# Patient Record
Sex: FEMALE | Race: WHITE | NOT HISPANIC OR LATINO | Employment: FULL TIME | URBAN - METROPOLITAN AREA
[De-identification: names, ages, dates, MRNs, and addresses within clinical notes are randomized per-mention and may not be internally consistent; named-entity substitution may affect disease eponyms.]

---

## 2018-03-09 ENCOUNTER — OFFICE VISIT (OUTPATIENT)
Dept: RETAIL CLINIC | Facility: CLINIC | Age: 28
End: 2018-03-09

## 2018-03-09 VITALS
HEART RATE: 86 BPM | OXYGEN SATURATION: 98 % | TEMPERATURE: 97.5 F | DIASTOLIC BLOOD PRESSURE: 92 MMHG | SYSTOLIC BLOOD PRESSURE: 130 MMHG

## 2018-03-09 DIAGNOSIS — J40 BRONCHITIS: Primary | ICD-10-CM

## 2018-03-09 PROCEDURE — 99213 OFFICE O/P EST LOW 20 MIN: CPT | Performed by: NURSE PRACTITIONER

## 2018-03-09 RX ORDER — ALBUTEROL SULFATE 90 UG/1
2 AEROSOL, METERED RESPIRATORY (INHALATION) EVERY 4 HOURS PRN
Qty: 1 INHALER | Refills: 0 | Status: SHIPPED | OUTPATIENT
Start: 2018-03-09

## 2018-03-09 RX ORDER — PREDNISONE 20 MG/1
TABLET ORAL
Qty: 20 TABLET | Refills: 0 | Status: SHIPPED | OUTPATIENT
Start: 2018-03-09 | End: 2021-12-08

## 2018-03-09 RX ORDER — ALBUTEROL SULFATE 90 UG/1
2 AEROSOL, METERED RESPIRATORY (INHALATION) EVERY 4 HOURS PRN
COMMUNITY
End: 2021-12-08 | Stop reason: SDUPTHER

## 2018-03-09 RX ORDER — AZITHROMYCIN 250 MG/1
TABLET, FILM COATED ORAL
Qty: 6 TABLET | Refills: 0 | Status: SHIPPED | OUTPATIENT
Start: 2018-03-09 | End: 2021-12-08

## 2018-03-09 NOTE — PROGRESS NOTES
Subjective:     Shirin Pena is a 28 y.o.     Cough   This is a new problem. The current episode started in the past 7 days. The problem has been gradually worsening. The cough is productive of sputum. Associated symptoms include nasal congestion (mild). Pertinent negatives include no chills, ear congestion, ear pain, fever, sore throat or wheezing. Shortness of breath: mild. Treatments tried: dayquil, nyquil, mucinex sinus, albuterol inhaler. The treatment provided mild relief.         The following portions of the patient's history were reviewed and updated as appropriate: allergies, current medications, past family history, past medical history, past social history, past surgical history and problem list.      Review of Systems   Constitutional: Positive for fatigue. Negative for appetite change, chills and fever.   HENT: Positive for congestion (mild). Negative for ear pain, sinus pain, sinus pressure and sore throat.    Respiratory: Positive for cough. Negative for wheezing. Shortness of breath: mild.    Cardiovascular: Negative.    Gastrointestinal: Negative for diarrhea, nausea and vomiting.   Musculoskeletal: Back pain: from coughing.   Skin: Negative for color change and pallor.   Neurological: Positive for dizziness and light-headedness.         Objective:      Physical Exam   Constitutional: She is oriented to person, place, and time. She appears well-developed and well-nourished. She is cooperative.   HENT:   Head: Normocephalic and atraumatic.   Right Ear: Tympanic membrane and ear canal normal.   Left Ear: Tympanic membrane and ear canal normal.   Mouth/Throat: Posterior oropharyngeal erythema present. No oropharyngeal exudate.   Eyes: Conjunctivae, EOM and lids are normal. Pupils are equal, round, and reactive to light.   Neck: Normal range of motion. Neck supple.   Cardiovascular: Normal rate, regular rhythm, S1 normal, S2 normal and normal heart sounds.    Pulmonary/Chest: Effort normal. She has  rhonchi in the right upper field, the right middle field and the left upper field.   Abdominal: Soft. Normal appearance and bowel sounds are normal. There is no tenderness.   Musculoskeletal: Normal range of motion.   Lymphadenopathy:     She has no cervical adenopathy.   Neurological: She is alert and oriented to person, place, and time.   Skin: Skin is warm, dry and intact.   Psychiatric: She has a normal mood and affect. Her speech is normal and behavior is normal. Thought content normal.   Vitals reviewed.          Shirin was seen today for cough.    Diagnoses and all orders for this visit:    Bronchitis    Other orders  -     predniSONE (DELTASONE) 20 MG tablet; Prednisone 20mg tabs, 3 for 3 days, 2 for 3 days, 1 for 3 days, 1/2 for 3 days take with food or milk  -     albuterol (PROVENTIL HFA) 108 (90 Base) MCG/ACT inhaler; Inhale 2 puffs Every 4 (Four) Hours As Needed for Wheezing or Shortness of Air.  -     azithromycin (ZITHROMAX) 250 MG tablet; Take 2 tablets the first day, then 1 tablet daily for 4 days.    If symptoms worsen or persist follow up with primary care provider.

## 2021-01-04 ENCOUNTER — IMMUNIZATION (OUTPATIENT)
Dept: VACCINE CLINIC | Facility: HOSPITAL | Age: 31
End: 2021-01-04

## 2021-01-04 PROCEDURE — 0001A: CPT | Performed by: INTERNAL MEDICINE

## 2021-01-04 PROCEDURE — 91300 HC SARSCOV02 VAC 30MCG/0.3ML IM: CPT | Performed by: INTERNAL MEDICINE

## 2021-01-25 ENCOUNTER — IMMUNIZATION (OUTPATIENT)
Dept: VACCINE CLINIC | Facility: HOSPITAL | Age: 31
End: 2021-01-25

## 2021-01-25 PROCEDURE — 0002A: CPT | Performed by: INTERNAL MEDICINE

## 2021-01-25 PROCEDURE — 91300 HC SARSCOV02 VAC 30MCG/0.3ML IM: CPT | Performed by: INTERNAL MEDICINE

## 2021-12-08 ENCOUNTER — OFFICE VISIT (OUTPATIENT)
Dept: INTERNAL MEDICINE | Facility: CLINIC | Age: 31
End: 2021-12-08

## 2021-12-08 VITALS
BODY MASS INDEX: 21.82 KG/M2 | SYSTOLIC BLOOD PRESSURE: 110 MMHG | WEIGHT: 139 LBS | DIASTOLIC BLOOD PRESSURE: 80 MMHG | HEIGHT: 67 IN | TEMPERATURE: 97.4 F

## 2021-12-08 DIAGNOSIS — L65.9 THINNING HAIR: Primary | ICD-10-CM

## 2021-12-08 DIAGNOSIS — Z11.4 SCREENING FOR HIV (HUMAN IMMUNODEFICIENCY VIRUS): ICD-10-CM

## 2021-12-08 DIAGNOSIS — K21.9 GASTROESOPHAGEAL REFLUX DISEASE, UNSPECIFIED WHETHER ESOPHAGITIS PRESENT: ICD-10-CM

## 2021-12-08 DIAGNOSIS — R63.4 WEIGHT LOSS: ICD-10-CM

## 2021-12-08 DIAGNOSIS — Z13.220 SCREENING FOR HYPERLIPIDEMIA: ICD-10-CM

## 2021-12-08 DIAGNOSIS — H61.21 IMPACTED CERUMEN OF RIGHT EAR: ICD-10-CM

## 2021-12-08 DIAGNOSIS — Z00.00 ANNUAL PHYSICAL EXAM: ICD-10-CM

## 2021-12-08 DIAGNOSIS — Z11.59 NEED FOR HEPATITIS C SCREENING TEST: ICD-10-CM

## 2021-12-08 PROCEDURE — 99204 OFFICE O/P NEW MOD 45 MIN: CPT | Performed by: STUDENT IN AN ORGANIZED HEALTH CARE EDUCATION/TRAINING PROGRAM

## 2021-12-08 PROCEDURE — 99385 PREV VISIT NEW AGE 18-39: CPT | Performed by: STUDENT IN AN ORGANIZED HEALTH CARE EDUCATION/TRAINING PROGRAM

## 2021-12-08 RX ORDER — OMEPRAZOLE 20 MG/1
20 CAPSULE, DELAYED RELEASE ORAL DAILY
COMMUNITY
End: 2021-12-08 | Stop reason: SDUPTHER

## 2021-12-08 RX ORDER — OMEPRAZOLE 20 MG/1
20 CAPSULE, DELAYED RELEASE ORAL DAILY
Qty: 90 CAPSULE | Refills: 3 | Status: SHIPPED | OUTPATIENT
Start: 2021-12-08

## 2021-12-08 NOTE — PROGRESS NOTES
"    Chief Complaint  Annual checkup    HISTORY    Shirin Mercer is a 31 y.o. female who presents to the office today as a  a new patient for their annual preventative exam. Their last preventative exam was years ago.     No hospitalization(s) within the last year.     Status of chronic medical conditions:    Previously saw Dr Ledezma, probably last saw them 5 or so years ago. Otherwise got care at urgent care places as needed.     Allergies: states she has an albuterol inhaler as needed, states she uses it a few times per year. States she has had the same inhaler now for a while.     GERD: States she has bene on prilosec for around a year and that helps a lot. States it is triggered by spicy food, stress, if she stays up too late. Denies food getting stuck in throat or pain in her stomach.     Sees [facility] Murray in February or March and they do her pap smears.     First day of last menstrual period if pre-menopausal: patient reports only occasional periods (5 or so per year) with her IUD     Patient's contraception status (if applicable): IUD    For exercise patient states she walks \"a lot\".     States she has made some changes in her diet , stopped eating at cafeteria and ate low carb meals with vegetables and fruits while staying away from sugar, now eating whatever she wanted and continued to have weight loss. Previously weighed 175 lbs- 180 lbs in the summer of 2020. Denies heart palpitations. States she has felt very very thirsty as well. Her  has noticed her hair is thinner than it used to be. The past couple of months she has stabilized and not lost any further weight.     Health Maintenance Summary          Overdue - TDAP/TD VACCINES (1 - Tdap) Overdue - never done    No completion history exists for this topic.          Ordered - HEPATITIS C SCREENING (Once) Ordered on 12/8/2021    No completion history exists for this topic.          Overdue - PAP SMEAR (Every 3 Years) Overdue " "- never done    No completion history exists for this topic.          ANNUAL PHYSICAL (Yearly) Next due on 12/9/2022 12/08/2021  Done          COVID-19 Vaccine (Series Information) Completed    01/25/2021  Imm Admin: COVID-19 (PFIZER)    01/04/2021  Imm Admin: COVID-19 (PFIZER)          INFLUENZA VACCINE  Completed    09/10/2021  Patient-Reported (Performed Externally) - approximate date per patient report          Pneumococcal Vaccine 0-64 (Series Information) Aged Out    No completion history exists for this topic.                 No Known Allergies     Outpatient Medications Marked as Taking for the 12/8/21 encounter (Office Visit) with Prem Fairbanks DO   Medication Sig Dispense Refill   • albuterol (PROVENTIL HFA) 108 (90 Base) MCG/ACT inhaler Inhale 2 puffs Every 4 (Four) Hours As Needed for Wheezing or Shortness of Air. 1 inhaler 0   • omeprazole (priLOSEC) 20 MG capsule Take 1 capsule by mouth Daily. 90 capsule 3   • [DISCONTINUED] omeprazole (priLOSEC) 20 MG capsule Take 20 mg by mouth Daily.          Past Medical History:   Diagnosis Date   • Allergies    • GERD (gastroesophageal reflux disease)      Past Surgical History:   Procedure Laterality Date   • WISDOM TOOTH EXTRACTION       Family History   Problem Relation Age of Onset   • Hypercalcemia Father    • Obesity Father    • Hyperlipidemia Father    • Cancer Maternal Grandmother    • Cancer Maternal Grandfather    • Allergies Mother    • No Known Problems Sister    • No Known Problems Brother     reports that she has never smoked. She has never used smokeless tobacco. She reports current alcohol use of about 2.0 standard drinks of alcohol per week. She reports that she does not use drugs.    Immunization History   Administered Date(s) Administered   • COVID-19 (PFIZER) 01/04/2021, 01/25/2021        OBJECTIVE    Vital Signs:   /80   Temp 97.4 °F (36.3 °C)   Ht 170.2 cm (67\")   Wt 63 kg (139 lb)   BMI 21.77 kg/m²     Physical " Exam  Constitutional:       General: She is not in acute distress.     Appearance: Normal appearance.   HENT:      Head: Normocephalic and atraumatic.      Right Ear: External ear normal. There is impacted cerumen.      Left Ear: Tympanic membrane, ear canal and external ear normal. There is no impacted cerumen.      Mouth/Throat:      Mouth: Mucous membranes are moist.      Pharynx: No oropharyngeal exudate or posterior oropharyngeal erythema.   Eyes:      General: No scleral icterus.     Extraocular Movements: Extraocular movements intact.      Conjunctiva/sclera: Conjunctivae normal.      Pupils: Pupils are equal, round, and reactive to light.   Cardiovascular:      Rate and Rhythm: Regular rhythm. Tachycardia present.      Heart sounds: Normal heart sounds. No murmur heard.      Pulmonary:      Effort: Pulmonary effort is normal. No respiratory distress.      Breath sounds: Normal breath sounds. No wheezing.   Abdominal:      General: Bowel sounds are normal. There is no distension.      Palpations: Abdomen is soft.      Tenderness: There is no abdominal tenderness. There is no guarding.   Musculoskeletal:      Cervical back: Neck supple.      Right lower leg: No edema.      Left lower leg: No edema.   Lymphadenopathy:      Cervical: No cervical adenopathy.   Skin:     General: Skin is warm and dry.      Coloration: Skin is not jaundiced.   Neurological:      General: No focal deficit present.      Mental Status: She is alert and oriented to person, place, and time.      Cranial Nerves: No cranial nerve deficit.      Motor: No weakness.   Psychiatric:         Mood and Affect: Mood normal.         Behavior: Behavior normal.         Thought Content: Thought content normal.                         ASSESSMENT & PLAN     #Annual Preventative Health Examination   -Age and sex appropriate physical exam performed and documented. Updated past medical, family, social and surgical histories as well as allergies and care  team list. Addressed care gaps listed in the medical record.  -Encouraged seat belt use for every car ride for patient and all occupants. Discussed securing of all guns in the home for patient and family protection. Encouraged sunscreen use to reduce risk of skin cancer for any days with sun exposure over 20 minutes. Recommended helmet if biking or riding motorcycle to prevent head trauma. Discussed the importance of smoke and carbon monoxide detectors in the home.   -Encouraged annual dental and vision exams as part of their overall health.  -Encouraged minimum of 30 minutes or more of exercise at a brisk walk or higher 5 days per week combined with a well-balanced diet.  -Immunizations reviewed and updated in EMR.  -Advised that all women who are planning or capable of pregnancy take a daily supplement containing 0.4 to 0.8 mg (400 to 800 ?g) of folic acid.  -Lipid screening: Will screen for familial hyperlipidemia today.   -Aspirin for primary or secondary prevention: Not applicable, patient is less than age 50.  -Depression screening: PHQ2 performed and the patient's screen was negative.  -Diabetes screening:  Screening not indicated at this time.   -Tobacco use screening: Patient denies cigarette use. Tobacco counseling was not indicated. Advised patient that vaping is discouraged and data is beginning to support that it is associated with adverse health outcomes as well.  -Alcohol use screening: Patient reports drinking 2 per week.  -Illicit drug screening: Patient does not use illicit drugs.   -Hypertension screening: Patient screened negative for HTN today.  -HIV screening: Discussed with patient the importance of identifying asymptomatic HIV infection for adults in their age group with a one time screening test .Patient accepted screening.   -Syphilis screening: Syphilis screening not indicated.  -Hepatitis B virus screening: Screening not indicated, not in a high-risk group.  -Hepatitis C virus screening:   Discussed with patient that the [place]PSTF recommends a one-time screening of hepatitis C in all adults 18-79 years old. Patient accepted screening.  -Colon cancer screening: Patient is less than age 45 and colon cancer screening is not indicated.  -Lung cancer screening: Patient has never smoked.  -Cervical cancer screening:  Informed patient that the [place]PSTF recommends screening for cervical cancer every 3 years with cervical cytology alone in women aged 21 to 29 years. For women aged 30 to 65 years, the [place]PSTF recommends screening every 3 years with cervical cytology alone, every 5 years with high-risk human papillomavirus (hrHPV) testing alone, or every 5 years with HPV testing in combination with cytology (cotesting). Will request records from [facility] to see her PAP status.  -Breast cancer screening: Informed patient that the [place]PSTF recommends biennial screening mammography for women aged 50 to 74 years. she has never had a mammogram. Screening not indicated at this time.   -BRCA related cancer screening: Informed patient that the [place]PSTF recommends that primary care clinicians assess women with a personal or family history of breast, ovarian, tubal, or peritoneal cancer or who have an ancestry associated with breast cancer susceptibility 1 and 2 (BRCA1/2) gene mutations with an appropriate brief familial risk assessment tool and referred to genetic counseling if risk assessment is positive. Patient does not have a known personal or family history.   -Osteoporosis screening: Informed patient that the [place]PSTF recommends screening for osteoporosis with bone measurement testing to prevent osteoporotic fractures in women 65 years and older. screening is not indicated at this time.     Follow up in 1 year for annual physical exam.      A problem-based visit was also conducted on the same day, see below for assessment and plan    Diagnoses and all orders for this visit:    1. Thinning hair (Primary)  2. Weight  loss  -Pt reports 35-40 pound weight loss over the last year after making significant changes in diet. She had some initial weight loss which she expected but then had some additional weight loss that was unexpected. Her weight has now been stable for several months at 139 lbs and she has a normal BMI. She feels well aside from hair thinning and feeling of dry mouth, so I will order TSH and screen for diabetes. As long as her weight stays stable, I believe we can continue to monitor. Her weight loss was likely related to her increased activity. She should contact me back if her weight loss reoccurs without her trying.   -     TSH Rfx On Abnormal To Free T4  -     Hemoglobin A1c    3. Impacted cerumen of right ear  -advised OTC ear wax softening drops    4. Gastroesophageal reflux disease, unspecified whether esophagitis present  -continue PPI therapy, no difficulty swallowing or other GI symptoms reported   -     omeprazole (priLOSEC) 20 MG capsule; Take 1 capsule by mouth Daily.  Dispense: 90 capsule; Refill: 3                The following social determinates of health impact the patient's medical decision making: No social determinates of health were factored in to today's visit.     Follow Up  Return in about 1 year (around 12/8/2022) for Annual physical.      Patient/family had no further questions at this time and verbalized understanding of the plan discussed today.

## 2021-12-09 ENCOUNTER — OFFICE VISIT (OUTPATIENT)
Dept: INTERNAL MEDICINE | Facility: CLINIC | Age: 31
End: 2021-12-09

## 2021-12-09 VITALS
TEMPERATURE: 97.5 F | BODY MASS INDEX: 21.82 KG/M2 | DIASTOLIC BLOOD PRESSURE: 78 MMHG | SYSTOLIC BLOOD PRESSURE: 100 MMHG | HEIGHT: 67 IN | WEIGHT: 139 LBS

## 2021-12-09 DIAGNOSIS — E10.9 TYPE 1 DIABETES MELLITUS WITHOUT COMPLICATION (HCC): Primary | ICD-10-CM

## 2021-12-09 DIAGNOSIS — Z23 ENCOUNTER FOR IMMUNIZATION: ICD-10-CM

## 2021-12-09 DIAGNOSIS — E87.29 HIGH ANION GAP METABOLIC ACIDOSIS: ICD-10-CM

## 2021-12-09 LAB
ALBUMIN SERPL-MCNC: 4.5 G/DL (ref 3.8–4.8)
ALBUMIN/GLOB SERPL: 2.1 {RATIO} (ref 1.2–2.2)
ALP SERPL-CCNC: 122 IU/L (ref 44–121)
ALT SERPL-CCNC: 24 IU/L (ref 0–32)
AST SERPL-CCNC: 19 IU/L (ref 0–40)
BASOPHILS # BLD AUTO: 0 X10E3/UL (ref 0–0.2)
BASOPHILS NFR BLD AUTO: 0 %
BILIRUB SERPL-MCNC: 0.4 MG/DL (ref 0–1.2)
BUN SERPL-MCNC: 14 MG/DL (ref 6–20)
BUN/CREAT SERPL: 18 (ref 9–23)
CALCIUM SERPL-MCNC: 9.5 MG/DL (ref 8.7–10.2)
CHLORIDE SERPL-SCNC: 94 MMOL/L (ref 96–106)
CHOLEST SERPL-MCNC: 217 MG/DL (ref 100–199)
CO2 SERPL-SCNC: 21 MMOL/L (ref 20–29)
CREAT SERPL-MCNC: 0.79 MG/DL (ref 0.57–1)
EOSINOPHIL # BLD AUTO: 0 X10E3/UL (ref 0–0.4)
EOSINOPHIL NFR BLD AUTO: 1 %
ERYTHROCYTE [DISTWIDTH] IN BLOOD BY AUTOMATED COUNT: 11.7 % (ref 11.7–15.4)
GLOBULIN SER CALC-MCNC: 2.1 G/DL (ref 1.5–4.5)
GLUCOSE SERPL-MCNC: 585 MG/DL (ref 65–99)
HBA1C MFR BLD: >15.5 % (ref 4.8–5.6)
HCT VFR BLD AUTO: 44.3 % (ref 34–46.6)
HCV AB S/CO SERPL IA: 0.1 S/CO RATIO (ref 0–0.9)
HDLC SERPL-MCNC: 48 MG/DL
HGB BLD-MCNC: 15 G/DL (ref 11.1–15.9)
HIV 1+2 AB+HIV1 P24 AG SERPL QL IA: NON REACTIVE
IMM GRANULOCYTES # BLD AUTO: 0 X10E3/UL (ref 0–0.1)
IMM GRANULOCYTES NFR BLD AUTO: 0 %
LDLC SERPL CALC-MCNC: 119 MG/DL (ref 0–99)
LDLC/HDLC SERPL: 2.5 RATIO (ref 0–3.2)
LYMPHOCYTES # BLD AUTO: 1.5 X10E3/UL (ref 0.7–3.1)
LYMPHOCYTES NFR BLD AUTO: 28 %
MCH RBC QN AUTO: 33 PG (ref 26.6–33)
MCHC RBC AUTO-ENTMCNC: 33.9 G/DL (ref 31.5–35.7)
MCV RBC AUTO: 98 FL (ref 79–97)
MONOCYTES # BLD AUTO: 0.4 X10E3/UL (ref 0.1–0.9)
MONOCYTES NFR BLD AUTO: 8 %
NEUTROPHILS # BLD AUTO: 3.3 X10E3/UL (ref 1.4–7)
NEUTROPHILS NFR BLD AUTO: 63 %
PLATELET # BLD AUTO: 228 X10E3/UL (ref 150–450)
POTASSIUM SERPL-SCNC: 5.3 MMOL/L (ref 3.5–5.2)
PROT SERPL-MCNC: 6.6 G/DL (ref 6–8.5)
RBC # BLD AUTO: 4.54 X10E6/UL (ref 3.77–5.28)
SODIUM SERPL-SCNC: 134 MMOL/L (ref 134–144)
TRIGL SERPL-MCNC: 287 MG/DL (ref 0–149)
TSH SERPL DL<=0.005 MIU/L-ACNC: 0.87 UIU/ML (ref 0.45–4.5)
VLDLC SERPL CALC-MCNC: 50 MG/DL (ref 5–40)
WBC # BLD AUTO: 5.3 X10E3/UL (ref 3.4–10.8)

## 2021-12-09 PROCEDURE — 90732 PPSV23 VACC 2 YRS+ SUBQ/IM: CPT | Performed by: STUDENT IN AN ORGANIZED HEALTH CARE EDUCATION/TRAINING PROGRAM

## 2021-12-09 PROCEDURE — 99214 OFFICE O/P EST MOD 30 MIN: CPT | Performed by: STUDENT IN AN ORGANIZED HEALTH CARE EDUCATION/TRAINING PROGRAM

## 2021-12-09 PROCEDURE — 90471 IMMUNIZATION ADMIN: CPT | Performed by: STUDENT IN AN ORGANIZED HEALTH CARE EDUCATION/TRAINING PROGRAM

## 2021-12-09 RX ORDER — DIPHENHYDRAMINE HYDROCHLORIDE 25 MG/1
CAPSULE, LIQUID FILLED ORAL
Qty: 1 EACH | Refills: 0 | Status: SHIPPED | OUTPATIENT
Start: 2021-12-09 | End: 2022-01-26 | Stop reason: CLARIF

## 2021-12-09 RX ORDER — INSULIN LISPRO 100 [IU]/ML
4 INJECTION, SOLUTION INTRAVENOUS; SUBCUTANEOUS
Qty: 4 ML | Refills: 5 | Status: SHIPPED | OUTPATIENT
Start: 2021-12-09 | End: 2022-03-18 | Stop reason: SDUPTHER

## 2021-12-09 RX ORDER — INSULIN GLARGINE 100 [IU]/ML
7 INJECTION, SOLUTION SUBCUTANEOUS NIGHTLY
Qty: 3 ML | Refills: 5 | Status: SHIPPED | OUTPATIENT
Start: 2021-12-09 | End: 2022-06-29 | Stop reason: SDUPTHER

## 2021-12-09 RX ORDER — LANCETS
EACH MISCELLANEOUS
Qty: 200 EACH | Refills: 5 | Status: SHIPPED | OUTPATIENT
Start: 2021-12-09

## 2021-12-09 RX ORDER — INSULIN ASPART INJECTION 100 [IU]/ML
INJECTION, SOLUTION SUBCUTANEOUS
Status: CANCELLED | OUTPATIENT
Start: 2021-12-09

## 2021-12-09 RX ORDER — FLURBIPROFEN SODIUM 0.3 MG/ML
SOLUTION/ DROPS OPHTHALMIC
Qty: 200 EACH | Refills: 5 | Status: SHIPPED | OUTPATIENT
Start: 2021-12-09 | End: 2022-01-07 | Stop reason: SDUPTHER

## 2021-12-09 RX ORDER — GLUCOSAMINE HCL/CHONDROITIN SU 500-400 MG
CAPSULE ORAL
Qty: 200 EACH | Refills: 5 | Status: SHIPPED | OUTPATIENT
Start: 2021-12-09 | End: 2022-01-07 | Stop reason: SDUPTHER

## 2021-12-09 NOTE — PROGRESS NOTES
"  Prem Fairbanks D.O.  Internal Medicine  [facility]  [address]  [place] [address]  [phone]      Chief Complaint  follow-up on abnormal labs    SUBJECTIVE    History of Present Illness    Shirin Mercer is a 31 y.o. female who presents to the office today as an established patient that last saw me on 12/8/2021.   Pt here for follow up after initial testing for dry mouth and weight loss revealed critical high blood pressure of 585.    Pt states this is a complete shock for her, she feels her normal self. She has not had a physical for around 5 years until she saw me yesterday but on previous labs was never told she had high blood sugar.          No Known Allergies     No outpatient medications have been marked as taking for the 12/9/21 encounter (Office Visit) with Prem Fairbanks DO.        Past Medical History:   Diagnosis Date   • Allergies    • GERD (gastroesophageal reflux disease)        OBJECTIVE    Vital Signs:   /78   Temp 97.5 °F (36.4 °C) (Temporal)   Ht 170.2 cm (67\")   Wt 63 kg (139 lb)   BMI 21.77 kg/m²     Physical Exam  Vitals reviewed.   Constitutional:       General: She is not in acute distress.     Appearance: Normal appearance. She is normal weight. She is not ill-appearing, toxic-appearing or diaphoretic.   Eyes:      General: No scleral icterus.  Pulmonary:      Effort: Pulmonary effort is normal. No respiratory distress.   Neurological:      Mental Status: She is alert and oriented to person, place, and time.   Psychiatric:         Mood and Affect: Mood normal.         Behavior: Behavior normal.            The following data was reviewed by: Prem Fairbanks DO on 12/09/2021:  Common labs    Common Labsle 12/8/21 12/8/21 12/8/21 12/8/21    1137 1137 1137 1137   Glucose   585 (A)    BUN   14    Creatinine   0.79    eGFR Non  Am   99    eGFR African Am   114    Sodium   134    Potassium   5.3 (A)    Chloride   94 (A)    Calcium   9.5    Total " Protein   6.6    Albumin   4.5    Total Bilirubin   0.4    Alkaline Phosphatase   122 (A)    AST (SGOT)   19    ALT (SGPT)   24    WBC  5.3     Hemoglobin  15.0     Hematocrit  44.3     Platelets  228     Total Cholesterol 217 (A)      Triglycerides 287 (A)      HDL Cholesterol 48      LDL Cholesterol  119 (A)      Hemoglobin A1C    >15.5 (A)   (A) Abnormal value       Comments are available for some flowsheets but are not being displayed.           Most Recent A1C    HGBA1C Most Recent 12/8/21   Hemoglobin A1C >15.5 (A)   (A) Abnormal value       Comments are available for some flowsheets but are not being displayed.                        ASSESSMENT & PLAN     Diagnoses and all orders for this visit:    1. Type 1 diabetes mellitus without complication (HCC) (Primary)  2. High anion gap metabolic acidosis   -Pt presents to office today for short term follow up of abnormal labs. Yesterday I performed screening for diabetes after patient had some feeling of dehydration plus weight loss. A1c came back greater than 15.5. This is a NEW diagnosis of diabetes and I suspect type 1 given her younger age, lack of other metabolic disturbances, normal BMI. Pt's blood glucose on labs yesterday was 585 but her blood sugar in office today was above the range of the meter. She is in no acute distress and on yesterday's labs had normal renal function, corrected anion gap elevated at 17.8 (bicarb normal at 21), potassium slightly high at 5.3. She is tolerating oral intake fine and has no abdominal complaints.  -Will refer urgently to endocrinology; provide pneumonia vaccine today; obtain urine microalbumin/cr ratio; refer for diabetic eye exam; refer to nutrition and diabetic education  -will initiate insulin therapy, her total daily insulin requirement is 19 units. Will start basaglar 7 units daily as well as insulin lispro 4 units with three meals of the day.   -Will have patient back for short term follow up in 2 weeks as well as  lab check for electrolytes in 4 weeks. She is to contact office if no improvement in numbers. She is to go to ER if she has abdominal pain, n/v/mental status change, inability to tolerate food and drink.   -diabetic foot exam at next visit  -     Blood Glucose Monitoring Suppl (Blood Glucose Monitor System) w/Device kit; Use to check finger stick blood sugar 4 times per day.  Dispense: 1 each; Refill: 0  -     Glucose Blood (Blood Glucose Test) strip; Use with blood glucose meter to check blood sugar 4 times per day.  Dispense: 200 each; Refill: 5  -     Insulin Pen Needle (BISONtiGRealOps SafePack Pen Needle) 32G X 4 MM misc; Use 4 needle 4 times daily for injection into the skin of the abdomen or upper outside of the thighs. Do not reuse needles.  Dispense: 200 each; Refill: 5  -     Lancets Thin misc; Use with lancet device to check blood glucose 4 times per day via finger stick.  Dispense: 200 each; Refill: 5  -     Insulin Glargine (BASAGLAR KWIKPEN) 100 UNIT/ML injection pen; Inject 7 Units under the skin into the appropriate area as directed Every Night.  Dispense: 3 mL; Refill: 5  -     Ambulatory Referral to Endocrinology  -     Ambulatory Referral for Diabetic Eye Exam-Ophthalmology  -     Ambulatory Referral to Diabetic Education  -     Ambulatory Referral to Nutrition Services  -     Basic metabolic panel; Future  -     Insulin Lispro, 1 Unit Dial, (HumaLOG KwikPen) 100 UNIT/ML solution pen-injector; Inject 4 Units under the skin into the appropriate area as directed 3 (Three) Times a Day With Meals.  Dispense: 4 mL; Refill: 5  -     Microalbumin / Creatinine Urine Ratio - Urine, Clean Catch    3. Encounter for immunization  -     Pneumococcal Polysaccharide Vaccine 23-Valent Greater Than or Equal To 1yo Subcutaneous / IM            The following social determinates of health impact the patient's medical decision making: No social determinates of health were factored in to today's visit.     Follow Up  Return  in about 2 weeks (around 12/23/2021) for Recheck.    Patient/family had no further questions at this time and verbalized understanding of the plan discussed today.

## 2021-12-10 LAB
ALBUMIN/CREAT UR: <18 MG/G CREAT (ref 0–29)
CREAT UR-MCNC: 16.3 MG/DL
MICROALBUMIN UR-MCNC: <3 UG/ML

## 2021-12-22 ENCOUNTER — IMMUNIZATION (OUTPATIENT)
Dept: VACCINE CLINIC | Facility: HOSPITAL | Age: 31
End: 2021-12-22

## 2021-12-22 ENCOUNTER — OFFICE VISIT (OUTPATIENT)
Dept: INTERNAL MEDICINE | Facility: CLINIC | Age: 31
End: 2021-12-22

## 2021-12-22 VITALS
HEART RATE: 85 BPM | OXYGEN SATURATION: 99 % | WEIGHT: 145 LBS | DIASTOLIC BLOOD PRESSURE: 68 MMHG | BODY MASS INDEX: 22.76 KG/M2 | TEMPERATURE: 97.5 F | HEIGHT: 67 IN | SYSTOLIC BLOOD PRESSURE: 102 MMHG

## 2021-12-22 DIAGNOSIS — E10.9 TYPE 1 DIABETES MELLITUS WITHOUT COMPLICATION (HCC): Primary | ICD-10-CM

## 2021-12-22 PROCEDURE — 0004A HC ADM SARSCOV2 30MCG/0.3ML BOOSTER: CPT | Performed by: INTERNAL MEDICINE

## 2021-12-22 PROCEDURE — 99214 OFFICE O/P EST MOD 30 MIN: CPT | Performed by: STUDENT IN AN ORGANIZED HEALTH CARE EDUCATION/TRAINING PROGRAM

## 2021-12-22 PROCEDURE — 91300 HC SARSCOV02 VAC 30MCG/0.3ML IM: CPT | Performed by: INTERNAL MEDICINE

## 2021-12-22 NOTE — PROGRESS NOTES
Prem Fairbanks D.O.  Internal Medicine  [facility]  [address]  [place] [address]  [phone]      Chief Complaint  Diabetes    SUBJECTIVE    History of Present Illness    Shirin Mercer is a 31 y.o. female who presents to the office today as an established patient that last saw me on 12/9/2021.   Here for short term follow up of Type 1 diabetes, recently diagnosed.  She is taking humalog 4 units with three meals daily and basaglar 7 units at night. In the morning she is running 290s to low 300s. She is running in the 300s 2 hours after meals. She definitely feels that she is making better food choices than she was before. She states that she feels better overall now that her sugar is coming down.     She is still pending an endocrinology appointment.     Denies other concerns or reports of hypoglycemia.       No Known Allergies     Outpatient Medications Marked as Taking for the 12/22/21 encounter (Office Visit) with Prem Fairbanks, DO   Medication Sig Dispense Refill   • albuterol (PROVENTIL HFA) 108 (90 Base) MCG/ACT inhaler Inhale 2 puffs Every 4 (Four) Hours As Needed for Wheezing or Shortness of Air. 1 inhaler 0   • Blood Glucose Monitoring Suppl (Blood Glucose Monitor System) w/Device kit Use to check finger stick blood sugar 4 times per day. 1 each 0   • Glucose Blood (Blood Glucose Test) strip Use with blood glucose meter to check blood sugar 4 times per day. 200 each 5   • Insulin Glargine (BASAGLAR KWIKPEN) 100 UNIT/ML injection pen Inject 7 Units under the skin into the appropriate area as directed Every Night. 3 mL 5   • Insulin Lispro, 1 Unit Dial, (HumaLOG KwikPen) 100 UNIT/ML solution pen-injector Inject 4 Units under the skin into the appropriate area as directed 3 (Three) Times a Day With Meals. 4 mL 5   • Insulin Pen Needle (UltiGuard SafePack Pen Needle) 32G X 4 MM misc Use 4 needle 4 times daily for injection into the skin of the abdomen or upper outside of  "the thighs. Do not reuse needles. 200 each 5   • Lancets Thin misc Use with lancet device to check blood glucose 4 times per day via finger stick. 200 each 5   • omeprazole (priLOSEC) 20 MG capsule Take 1 capsule by mouth Daily. 90 capsule 3        Past Medical History:   Diagnosis Date   • Allergies    • GERD (gastroesophageal reflux disease)    • Type 1 diabetes mellitus (HCC)        OBJECTIVE    Vital Signs:   /68   Pulse 85   Temp 97.5 °F (36.4 °C) (Temporal)   Ht 170.2 cm (67\")   Wt 65.8 kg (145 lb)   SpO2 99%   BMI 22.71 kg/m²     Physical Exam  Vitals reviewed.   Constitutional:       General: She is not in acute distress.     Appearance: Normal appearance. She is normal weight.   Eyes:      General: No scleral icterus.  Pulmonary:      Effort: Pulmonary effort is normal. No respiratory distress.   Neurological:      Mental Status: She is alert.   Psychiatric:         Mood and Affect: Mood normal.         Behavior: Behavior normal.                             ASSESSMENT & PLAN     Diagnoses and all orders for this visit:    1. Type 1 diabetes mellitus without complication (HCC) (Primary)  -Pt here today for short term follow up of type 1 diabetes. She was started on insulin (lispro 4 units daily with 3 meals daily as well as glargine 7 units daily) at her last visit on 12/9/21. Reports compliance with her regimen and still have sugars in the 200s-300s consistently, but much improved from when she was diagnosed on 12/9. Her A1c at that time was above the reference range.   -She reports feeling much better subjectively and this is great. Her electrolyte derangements have resolved upon review of labs.  -She is still pending an endocrinology evaluation, but has a referral out.  -I would like for her to begin a titration every 3 days of ONE of her insulin doses, up to a max of 14 units glargine and 8 units with meals. Every 3rd day she will adjust her basaglar follow by breakfast dose 3 days later " followed by lunch dose 3 days later followed by dinner dose 3 days later and then repeat the cycle. Provided instructions in office. She is making much healthier choices, continue to monitor for hypoglycemia.   -Will have her come back in 1 month with telehealth visit to assess what her dosages are up to and how she is feeling and ensure she is not having hypoglycemia.          The following social determinates of health impact the patient's medical decision making: No social determinates of health were factored in to today's visit.     Follow Up  Return in about 4 weeks (around 1/19/2022) for Video visit.    Patient/family had no further questions at this time and verbalized understanding of the plan discussed today.

## 2021-12-30 ENCOUNTER — TELEMEDICINE (OUTPATIENT)
Dept: FAMILY MEDICINE CLINIC | Facility: TELEHEALTH | Age: 31
End: 2021-12-30

## 2021-12-30 DIAGNOSIS — Z20.822 EXPOSURE TO COVID-19 VIRUS: ICD-10-CM

## 2021-12-30 DIAGNOSIS — Z20.822 SUSPECTED COVID-19 VIRUS INFECTION: Primary | ICD-10-CM

## 2021-12-30 PROCEDURE — BHEMPVIDEOVISIT: Performed by: NURSE PRACTITIONER

## 2021-12-30 NOTE — PROGRESS NOTES
You have chosen to receive care through a telehealth visit.  Do you consent to use a video/audio connection for your medical care today? Yes     CHIEF COMPLAINT  Chief Complaint   Patient presents with   • Exposure To Known Illness         HPI  Shirin Mercer is a 31 y.o. female  presents with complaint of exposed to COVID-19 positive person over [place] weekend (Thursday and Sunday); began having symptoms Monday evening with body aches, chest tightness, nasal congestion, runny nose, mild cough.  Denies fever, shortness of breath, wheezing.     At home COVID-19 test was positive    Review of Systems   Constitutional: Positive for fatigue. Negative for fever.   HENT: Positive for congestion and rhinorrhea.    Respiratory: Positive for cough and chest tightness.    Musculoskeletal: Positive for myalgias.   All other systems reviewed and are negative.      Past Medical History:   Diagnosis Date   • Allergies    • GERD (gastroesophageal reflux disease)    • Type 1 diabetes mellitus (HCC)        Family History   Problem Relation Age of Onset   • Hypercalcemia Father    • Obesity Father    • Hyperlipidemia Father    • Cancer Maternal Grandmother    • Cancer Maternal Grandfather    • Allergies Mother    • No Known Problems Sister    • No Known Problems Brother        Social History     Socioeconomic History   • Marital status:    Tobacco Use   • Smoking status: Never Smoker   • Smokeless tobacco: Never Used   Substance and Sexual Activity   • Alcohol use: Yes     Alcohol/week: 2.0 standard drinks     Types: 2 Standard drinks or equivalent per week   • Drug use: No   • Sexual activity: Yes     Birth control/protection: I.U.D.         There were no vitals taken for this visit.    PHYSICAL EXAM  Physical Exam   Constitutional: She is oriented to person, place, and time. She appears well-developed and well-nourished. She does not have a sickly appearance. She does not appear ill.   HENT:   Head: Normocephalic and  atraumatic.   Pulmonary/Chest: Effort normal.  No respiratory distress.  Neurological: She is alert and oriented to person, place, and time.         Diagnoses and all orders for this visit:    1. Suspected COVID-19 virus infection (Primary)  -     COVID-19,[facility] ROUTINE, NP/OP SWAB IN TRANSPORT MEDIA OR ESWAB 72 HR TAT - Swab, Nasopharynx; Future    2. Exposure to COVID-19 virus  -     COVID-19,[facility] ROUTINE, NP/OP SWAB IN TRANSPORT MEDIA OR ESWAB 72 HR TAT - Swab, Nasopharynx; Future    --COVID-19 test to rule out infection  --quarantine and symptomatic treatment  -- will determine RTW      FOLLOW-UP  As discussed during visit with PCP/[facility]] if no improvement or Urgent Care/Emergency Department if worsening of symptoms    Patient verbalizes understanding of medication dosage, comfort measures, instructions for treatment and follow-up.    Keisha Winter, APRN  12/30/2021  11:47 EST    This visit was performed via Telehealth.  This patient has been instructed to follow-up with their primary care provider if their symptoms worsen or the treatment provided does not resolve their illness.

## 2021-12-30 NOTE — PATIENT INSTRUCTIONS
10 Things You Can Do to Manage Your COVID-19 Symptoms at Home  If you have possible or confirmed COVID-19:  1. Stay home except to get medical care.  2. Monitor your symptoms carefully. If your symptoms get worse, call your healthcare provider immediately.  3. Get rest and stay hydrated.  4. If you have a medical appointment, call the healthcare provider ahead of time and tell them that you have or may have COVID-19.  5. For medical emergencies, call 911 and notify the dispatch personnel that you have or may have COVID-19.  6. Cover your cough and sneezes with a tissue or use the inside of your elbow.  7. Wash your hands often with soap and water for at least 20 seconds or clean your hands with an alcohol-based hand  that contains at least 60% alcohol.  8. As much as possible, stay in a specific room and away from other people in your home. Also, you should use a separate bathroom, if available. If you need to be around other people in or outside of the home, wear a mask.  9. Avoid sharing personal items with other people in your household, like dishes, towels, and bedding.  10. Clean all surfaces that are touched often, like counters, tabletops, and doorknobs. Use household cleaning sprays or wipes according to the label instructions.  cdc.gov/coronavirus  07/16/2021  This information is not intended to replace advice given to you by your health care provider. Make sure you discuss any questions you have with your health care provider.  Document Revised: 08/04/2021 Document Reviewed: 08/04/2021  [employer] Patient Education © 2021 [employer]    How to Quarantine at Home  Information for Patients and Families    These instructions are for people with confirmed or suspected COVID-19 who do not need to be hospitalized and those with confirmed COVID-19 who were hospitalized and discharged to care for themselves at home.    If you were tested through the Health Department  The Health Department will monitor  your wellbeing.  If it is determined that you do not need to be hospitalized and can be isolated at home, you will be monitored by staff from your local or state health department.     If you were tested through a Commercial Lab  You will need to monitor yourself and report changes in your symptoms to your doctor.  See the section below called Monitor Your Symptoms.    Follow these steps until a healthcare provider or local or state health department says you can return to your normal activities.    Stay home except to get medical care  • Restrict activities outside your home, except for getting medical care.   • Do not go to work, school, or public areas.   • Avoid using public transportation, ride-sharing, or taxis.    Separate yourself from other people and animals in your home  People  As much as possible, you should stay in a specific room and away from other people in your home. Also, you should use a separate bathroom, if available.    Animals  You should restrict contact with pets and other animals while you are sick with COVID-19, just like you would around other people. When possible, have another member of your household care for your animals while you are sick. If you are sick with COVID-19, avoid contact with your pet, including petting, snuggling, being kissed or licked, and sharing food. If you must care for your pet or be around animals while you are sick, wash your hands before and after you interact with pets and wear a facemask. See COVID-19 and Animals for more information.    Call ahead before visiting your doctor  If you have a medical appointment, call the healthcare provider and tell them that you have or may have COVID-19. This information will help the healthcare provider’s office take steps to keep other people from getting infected or exposed.    Wear a facemask  You should wear a facemask when you are around other people (e.g., sharing a room or vehicle) or pets and before you enter a  healthcare provider’s office.     If you are not able to wear a facemask (for example, because it causes trouble breathing), then people who live with you should not stay in the same room with you, or they should wear a facemask if they enter your room.    Cover your coughs and sneezes  • Cover your mouth and nose with a tissue when you cough or sneeze.   • Throw used tissues in a lined trash can.   • Immediately wash your hands with soap and water for at least 20 seconds or, if soap and water are not available, clean your hands with an alcohol-based hand  that contains at least 60% alcohol.    Clean your hands often  • Wash your hands often with soap and water for at least 20 seconds, especially after blowing your nose, coughing, or sneezing; going to the bathroom; and before eating or preparing food.     • If soap and water are not readily available, use an alcohol-based hand  with at least 60% alcohol, covering all surfaces of your hands and rubbing them together until they feel dry.    • Soap and water are the best option if hands are visibly dirty. Avoid touching your eyes, nose, and mouth with unwashed hands.    Avoid sharing personal household items  • You should not share dishes, drinking glasses, cups, eating utensils, towels, or bedding with other people or pets in your home.   • After using these items, they should be washed thoroughly with soap and water.    Clean all “high-touch” surfaces everyday  • High touch surfaces include counters, tabletops, doorknobs, bathroom fixtures, toilets, phones, keyboards, tablets, and bedside tables.   • Also, clean any surfaces that may have blood, stool, or body fluids on them.   • Use a household cleaning spray or wipe, according to the label instructions. Labels contain instructions for safe and effective use of the cleaning product, including precautions you should take when applying the product, such as wearing gloves and making sure you have  good ventilation during use of the product.    Monitor your symptoms  • Seek prompt medical attention if your illness is worsening (e.g., difficulty breathing).   • Before seeking care, call your healthcare provider and tell them that you have, or are being evaluated for, COVID-19.   • Put on a facemask before you enter the facility.     • These steps will help the healthcare provider’s office to keep other people in the office or waiting room from getting infected or exposed.   • Persons who are placed under active monitoring or facilitated self-monitoring should follow instructions provided by their local health department or occupational health professionals, as appropriate.  • If you have a medical emergency and need to call 911, notify the dispatch personnel that you have, or are being evaluated for COVID-19. If possible, put on a facemask before emergency medical services arrive.    Discontinuing home isolation  Patients with confirmed COVID-19 should remain under home isolation precautions until the risk of secondary transmission to others is thought to be low. The decision to discontinue home isolation precautions should be made on a case-by-case basis, in consultation with healthcare providers and state and local health departments.    The below content are for household members, intimate partners, and caregivers of a patient with symptomatic laboratory-confirmed COVID-19 or a patient under investigation:    Household members, intimate partners, and caregivers may have close contact with a person with symptomatic, laboratory-confirmed COVID-19 or a person under investigation.     Close contacts should monitor their health; they should call their healthcare provider right away if they develop symptoms suggestive of COVID-19 (e.g., fever, cough, shortness of breath)     Close contacts should also follow these recommendations:  • Make sure that you understand and can help the patient follow their healthcare  provider’s instructions for medication(s) and care. You should help the patient with basic needs in the home and provide support for getting groceries, prescriptions, and other personal needs.  • Monitor the patient’s symptoms. If the patient is getting sicker, call his or her healthcare provider and tell them that the patient has laboratory-confirmed COVID-19. This will help the healthcare provider’s office take steps to keep other people in the office or waiting room from getting infected. Ask the healthcare provider to call the local or [facility] health department for additional guidance. If the patient has a medical emergency and you need to call 911, notify the dispatch personnel that the patient has, or is being evaluated for COVID-19.  • Household members should stay in another room or be  from the patient as much as possible. Household members should use a separate bedroom and bathroom, if available.  • Prohibit visitors who do not have an essential need to be in the home.  • Household members should care for any pets in the home. Do not handle pets or other animals while sick.  For more information, see COVID-19 and Animals.  • Make sure that shared spaces in the home have good air flow, such as by an air conditioner or an opened window, weather permitting.  • Perform hand hygiene frequently. Wash your hands often with soap and water for at least 20 seconds or use an alcohol-based hand  that contains 60 to 95% alcohol, covering all surfaces of your hands and rubbing them together until they feel dry. Soap and water should be used preferentially if hands are visibly dirty.  • Avoid touching your eyes, nose, and mouth with unwashed hands.  • The patient should wear a facemask when you are around other people. If the patient is not able to wear a facemask (for example, because it causes trouble breathing), you, as the caregiver, should wear a mask when you are in the same room as the  patient.  • Wear a disposable facemask and gloves when you touch or have contact with the patient’s blood, stool, or body fluids, such as saliva, sputum, nasal mucus, vomit, or urine.   o Throw out disposable facemasks and gloves after using them. Do not reuse.  o When removing personal protective equipment, first remove and dispose of gloves. Then, immediately clean your hands with soap and water or alcohol-based hand . Next, remove and dispose of facemask, and immediately clean your hands again with soap and water or alcohol-based hand .  • Avoid sharing household items with the patient. You should not share dishes, drinking glasses, cups, eating utensils, towels, bedding, or other items. After the patient uses these items, you should wash them thoroughly (see below “Wash laundry thoroughly”).  • Clean all “high-touch” surfaces, such as counters, tabletops, doorknobs, bathroom fixtures, toilets, phones, keyboards, tablets, and bedside tables, every day. Also, clean any surfaces that may have blood, stool, or body fluids on them.   o Use a household cleaning spray or wipe, according to the label instructions. Labels contain instructions for safe and effective use of the cleaning product including precautions you should take when applying the product, such as wearing gloves and making sure you have good ventilation during use of the product.  • Wash laundry thoroughly.   o Immediately remove and wash clothes or bedding that have blood, stool, or body fluids on them.  o Wear disposable gloves while handling soiled items and keep soiled items away from your body. Clean your hands (with soap and water or an alcohol-based hand ) immediately after removing your gloves.  o Read and follow directions on labels of laundry or clothing items and detergent. In general, using a normal laundry detergent according to washing machine instructions and dry thoroughly using the warmest temperatures  recommended on the clothing label.  • Place all used disposable gloves, facemasks, and other contaminated items in a lined container before disposing of them with other household waste. Clean your hands (with soap and water or an alcohol-based hand ) immediately after handling these items. Soap and water should be used preferentially if hands are visibly dirty.  • Discuss any additional questions with your state or local health department or healthcare provider.    Adapted from information provided by the Centers for Disease Control and Prevention.  For more information, visit https://www.cdc.gov/coronavirus/2019-ncov/hcp/guidance-prevent-spread.html

## 2022-01-05 ENCOUNTER — APPOINTMENT (OUTPATIENT)
Dept: DIABETES SERVICES | Facility: HOSPITAL | Age: 32
End: 2022-01-05

## 2022-01-07 DIAGNOSIS — E10.9 TYPE 1 DIABETES MELLITUS WITHOUT COMPLICATION: ICD-10-CM

## 2022-01-07 RX ORDER — FLURBIPROFEN SODIUM 0.3 MG/ML
SOLUTION/ DROPS OPHTHALMIC
Qty: 200 EACH | Refills: 5 | Status: SHIPPED | OUTPATIENT
Start: 2022-01-07 | End: 2022-06-29 | Stop reason: SDUPTHER

## 2022-01-07 RX ORDER — GLUCOSAMINE HCL/CHONDROITIN SU 500-400 MG
CAPSULE ORAL
Qty: 200 EACH | Refills: 5 | Status: SHIPPED | OUTPATIENT
Start: 2022-01-07 | End: 2022-01-17 | Stop reason: SDUPTHER

## 2022-01-12 ENCOUNTER — HOSPITAL ENCOUNTER (OUTPATIENT)
Dept: DIABETES SERVICES | Facility: HOSPITAL | Age: 32
Discharge: HOME | End: 2022-01-12
Admitting: STUDENT IN AN ORGANIZED HEALTH CARE EDUCATION/TRAINING PROGRAM

## 2022-01-12 PROCEDURE — G0108 DIAB MANAGE TRN  PER INDIV: HCPCS

## 2022-01-17 DIAGNOSIS — E10.9 TYPE 1 DIABETES MELLITUS WITHOUT COMPLICATION: ICD-10-CM

## 2022-01-17 RX ORDER — GLUCOSAMINE HCL/CHONDROITIN SU 500-400 MG
CAPSULE ORAL
Qty: 200 EACH | Refills: 5 | Status: SHIPPED | OUTPATIENT
Start: 2022-01-17 | End: 2022-01-20 | Stop reason: SDUPTHER

## 2022-01-19 ENCOUNTER — TELEMEDICINE (OUTPATIENT)
Dept: INTERNAL MEDICINE | Facility: CLINIC | Age: 32
End: 2022-01-19

## 2022-01-19 ENCOUNTER — TELEPHONE (OUTPATIENT)
Dept: INTERNAL MEDICINE | Facility: CLINIC | Age: 32
End: 2022-01-19

## 2022-01-19 DIAGNOSIS — E10.9 TYPE 1 DIABETES MELLITUS WITHOUT COMPLICATION: Primary | ICD-10-CM

## 2022-01-19 PROCEDURE — 99214 OFFICE O/P EST MOD 30 MIN: CPT | Performed by: STUDENT IN AN ORGANIZED HEALTH CARE EDUCATION/TRAINING PROGRAM

## 2022-01-19 NOTE — TELEPHONE ENCOUNTER
Ok for HUB to read and schedule:    Left voice mail for patient to call office and schedule a mychart video visit with Dr. Fairbanks in 5 weeks for a follow up for diabetes

## 2022-01-19 NOTE — PROGRESS NOTES
Prem Fairbanks D.O.  Internal Medicine  [facility]  [address]  [place] [address]  [phone]      Chief Complaint  Follow up diabetes    SUBJECTIVE    History of Present Illness    Shirin Mercer is a 31 y.o. female who presents to the office today as an established patient that last saw me on 12/22/2021.     Patient seen via zoom audio and video today.    Type 1 diabetes: states she is continuing to see her numbers come down.   In the morning fasting she was 270 yesterday but yesterday 153 fasting.    Taking 8 units humalog with 3 meals a day and 12 units of basaglar nightly.   She felt shaky once a few hours after breakfast recently but it was only in the 90s.     She met with dietician and diabetes counselor last week. Learning how to make carb choices. States it was really helpful.     Still has not seen endocrinologist.    No Known Allergies     Outpatient Medications Marked as Taking for the 1/19/22 encounter (Appointment) with Prem Fairbanks, DO   Medication Sig Dispense Refill   • albuterol (PROVENTIL HFA) 108 (90 Base) MCG/ACT inhaler Inhale 2 puffs Every 4 (Four) Hours As Needed for Wheezing or Shortness of Air. 1 inhaler 0   • Insulin Glargine (BASAGLAR KWIKPEN) 100 UNIT/ML injection pen Inject 7 Units under the skin into the appropriate area as directed Every Night. 3 mL 5   • Insulin Lispro, 1 Unit Dial, (HumaLOG KwikPen) 100 UNIT/ML solution pen-injector Inject 4 Units under the skin into the appropriate area as directed 3 (Three) Times a Day With Meals. 4 mL 5   • Insulin Pen Needle (EduartiGrubia SafePack Pen Needle) 32G X 4 MM misc Use 4 needle 4 times daily for injection into the skin of the abdomen or upper outside of the thighs. Do not reuse needles. 200 each 5   • omeprazole (priLOSEC) 20 MG capsule Take 1 capsule by mouth Daily. 90 capsule 3        Past Medical History:   Diagnosis Date   • Allergies    • GERD (gastroesophageal reflux disease)    • Type 1  diabetes mellitus (HCC)        OBJECTIVE    Vital Signs:   There were no vitals taken for this visit.    Physical Exam  Constitutional:       General: She is not in acute distress.     Appearance: Normal appearance. She is not ill-appearing.   Eyes:      General: No scleral icterus.  Pulmonary:      Effort: Pulmonary effort is normal. No respiratory distress.   Neurological:      Mental Status: She is alert.   Psychiatric:         Mood and Affect: Mood normal.         Behavior: Behavior normal.      limited 2/2 telehealth visit                        ASSESSMENT & PLAN     Diagnoses and all orders for this visit:    1. Type 1 diabetes mellitus without complication (HCC) (Primary)  -Pt seen via telehealth for short term follow up of presumptive type 1 diabetes, diagnosed December 2021. She was initially started on insulin (lispro 4 units daily with 3 meals daily as well as glargine 7 units daily) and uptitrated slowly to 8 units lispro with three meals daily as well as 12 units glargine at night  -Reports that sugars continue to decrease, still having upper 100s -200s fasting. She did feel hypoglycemic after breakfast recently but her sugar was only in the 90s.   -She is still pending an endocrinology evaluation, but has a referral out.  -I would like for her to continue titration every 3 days of one of her insulin doses, up to a max of 18 units glargine and 12 units with LUNCH and DINNER. Will KEEP her breakfast lispro dosage at 8 units given that she is already getting into the 90s to low 100s after breakfast.  - Every 3rd day she will adjust her basaglar follow by lunch dose 3 days later followed by dinner dose 3 days later and then repeat the cycle. She is making much healthier choices, continue to monitor for hypoglycemia.   -Will have her come back in 1 month with telehealth visit to assess what her dosages are up to and how she is feeling and ensure she is not having hypoglycemia.  -Will see if we can get a  continuous glucose monitor approved as well   -     Ambulatory Referral for Diabetic Eye Exam-Ophthalmology  -     Continuous Blood Gluc  (FreeStyle Marcia 2 Broadus) device; 1 Device 1 (One) Time for 1 dose.  Dispense: 1 each; Refill: 0  -     Continuous Blood Gluc Sensor (FreeStyle Marcia 2 Sensor) misc; 1 Device Every 14 (Fourteen) Days.  Dispense: 2 each; Refill: 5            The following social determinates of health impact the patient's medical decision making: No social determinates of health were factored in to today's visit.     Follow Up  No follow-ups on file.    Patient/family had no further questions at this time and verbalized understanding of the plan discussed today.

## 2022-01-19 NOTE — TELEPHONE ENCOUNTER
----- Message from Malena Ruiz MA sent at 1/19/2022 12:25 PM EST -----    ----- Message -----  From: Prem Fairbanks DO  Sent: 1/19/2022   9:52 AM EST  To: Dennies Garrick, MA    Please call pt and make a 5 week telehealth follow up for diabetes.  Thank you

## 2022-01-20 DIAGNOSIS — E10.9 TYPE 1 DIABETES MELLITUS WITHOUT COMPLICATION: ICD-10-CM

## 2022-01-26 ENCOUNTER — TELEPHONE (OUTPATIENT)
Dept: INTERNAL MEDICINE | Facility: CLINIC | Age: 32
End: 2022-01-26

## 2022-01-26 RX ORDER — BLOOD-GLUCOSE METER
1 EACH MISCELLANEOUS TAKE AS DIRECTED
Qty: 1 KIT | Refills: 0 | Status: SHIPPED | OUTPATIENT
Start: 2022-01-26

## 2022-01-26 RX ORDER — PERPHENAZINE 16 MG/1
1 TABLET, FILM COATED ORAL 4 TIMES DAILY
Qty: 200 EACH | Refills: 6 | Status: SHIPPED | OUTPATIENT
Start: 2022-01-26

## 2022-01-26 NOTE — TELEPHONE ENCOUNTER
Caller: [facility]    Relationship: Provider    Best call back number: [phone]    What was the call regarding: PLEASE ADVISE IF THE PRIOR AUTH PAPERWORK WAS RECEIVED FROM [facility] REGARDING PATIENT'S Continuous Blood Gluc Sensor (FreeStyle Marcia 2 Sensor) misc AND THE [employer]. COMPANY WILL FAX PAPERWORK AGAIN TODAY.     Do you require a callback: YES

## 2022-01-27 NOTE — TELEPHONE ENCOUNTER
Caller: [facility]    Relationship: Other    Best call back number: [phone]    What is the best time to reach you: ANY TIME    Who are you requesting to speak with (clinical staff, provider,  specific staff member): CLINICAL STAFF    What was the call regarding: [facility] CALLING TO VERIFY IF WE RECEIVED A FAX REQUESTING ADDITIONAL INFORMATION FOR PATIENT. THE REQUEST FOR OFFICE VISIT NOTES SHOWING WHAT SHE HAS TRIED AND FAILED. THEY ARE GOING TO REFAX RIGHT NOW.    Do you require a callback: YES

## 2022-03-02 ENCOUNTER — TELEMEDICINE (OUTPATIENT)
Dept: INTERNAL MEDICINE | Facility: CLINIC | Age: 32
End: 2022-03-02

## 2022-03-02 DIAGNOSIS — E10.9 TYPE 1 DIABETES MELLITUS WITHOUT COMPLICATION: Primary | ICD-10-CM

## 2022-03-02 DIAGNOSIS — E78.2 MIXED HYPERLIPIDEMIA: ICD-10-CM

## 2022-03-02 PROCEDURE — 99214 OFFICE O/P EST MOD 30 MIN: CPT | Performed by: STUDENT IN AN ORGANIZED HEALTH CARE EDUCATION/TRAINING PROGRAM

## 2022-03-02 NOTE — PROGRESS NOTES
Prem Fairbanks D.O.  Internal Medicine  [facility]  [address]  [place] [address]  [phone]      Chief Complaint  Type 1 diabetes follow up    SUBJECTIVE    History of Present Illness    Shirin Mercer is a 32 y.o. female who presents to the office today as an established patient that last saw me on 1/19/22 for telehealth visit.     She got established with Endocrinologist Dr Will who has her on a sliding scale for mealtime and up to 15 units for long acting. She has a diabetic eye exam scheduled early April.   When she wakes up in the morning her sugars are running in the 150s fasting.   Reports she is interested in getting a continuous glucose monitor but it was denied by insurance.       No Known Allergies     No outpatient medications have been marked as taking for the 3/2/22 encounter (Telemedicine) with Prem Fairbanks DO.        Past Medical History:   Diagnosis Date   • Allergies    • GERD (gastroesophageal reflux disease)    • Type 1 diabetes mellitus (HCC)        OBJECTIVE    Vital Signs:   There were no vitals taken for this visit.    Physical Exam  Constitutional:       General: She is not in acute distress.     Appearance: Normal appearance.   Eyes:      General: No scleral icterus.  Pulmonary:      Effort: Pulmonary effort is normal. No respiratory distress.   Neurological:      Mental Status: She is alert.   Psychiatric:         Mood and Affect: Mood normal.         Behavior: Behavior normal.        Physical exam limited 2/2 telehealth                     ASSESSMENT & PLAN     Diagnoses and all orders for this visit:    1. Type 1 diabetes mellitus without complication (HCC) (Primary)  -Pt seen via telehealth for short term follow up of presumptive type 1 diabetes, diagnosed December 2021 after random blood sugar was >500 and A1c resulted at >15.5 Since then, patient has been initiated on insulin therapy and subsequently modified by endocrinologist that she  was able to get established with since last visit.   -she reports that she is on a sliding scale for mealtime and up to 15 units for long acting. She has a diabetic eye exam scheduled early April.  When she wakes up in the morning her sugars are running in the 150s fasting.   -will defer primary care of Type 1 DM to endocrinology at this point  -diabetic foot exam at next in person visit, advised pt to call and schedule for 3 months from now    2. Mixed hyperlipidemia  -Cholesterol profile from 12-8-2021 demonstrated total cholesterol 217, triglycerides 287, HDL 48, .  -We will obtain fasting lipid profile at her next visit and initiate statin therapy if her LDL remains elevated per ADA guidelines          The following social determinates of health impact the patient's medical decision making: No social determinates of health were factored in to today's visit.     Follow Up  No follow-ups on file.    Patient/family had no further questions at this time and verbalized understanding of the plan discussed today.

## 2022-03-18 DIAGNOSIS — E10.9 TYPE 1 DIABETES MELLITUS WITHOUT COMPLICATION: ICD-10-CM

## 2022-03-18 RX ORDER — INSULIN LISPRO 100 [IU]/ML
INJECTION, SOLUTION INTRAVENOUS; SUBCUTANEOUS
Qty: 15 ML | Refills: 5 | Status: SHIPPED | OUTPATIENT
Start: 2022-03-18 | End: 2022-03-29 | Stop reason: SDUPTHER

## 2022-03-21 ENCOUNTER — TELEPHONE (OUTPATIENT)
Dept: INTERNAL MEDICINE | Facility: CLINIC | Age: 32
End: 2022-03-21

## 2022-03-21 NOTE — TELEPHONE ENCOUNTER
Caller: [facility]    Relationship to patient: Pharmacy    Best call back number: [phone]    Patient is needing: PHARMACY NEEDS TO KNOW THE SLIDING SCALE DIRECTIONS ON Insulin Lispro, 1 Unit Dial, (HumaLOG KwikPen) 100 UNIT/ML solution pen-injector  PLEASE ADVISE.

## 2022-03-29 ENCOUNTER — TELEPHONE (OUTPATIENT)
Dept: INTERNAL MEDICINE | Facility: CLINIC | Age: 32
End: 2022-03-29

## 2022-03-29 DIAGNOSIS — E10.9 TYPE 1 DIABETES MELLITUS WITHOUT COMPLICATION: ICD-10-CM

## 2022-03-29 RX ORDER — INSULIN LISPRO 100 [IU]/ML
INJECTION, SOLUTION INTRAVENOUS; SUBCUTANEOUS
Qty: 15 ML | Refills: 5 | Status: SHIPPED | OUTPATIENT
Start: 2022-03-29 | End: 2022-04-04 | Stop reason: SDUPTHER

## 2022-03-29 NOTE — TELEPHONE ENCOUNTER
PT PHARMACY IS REQUESTING A CALL BACK TO GET CLARIFICATIONS ON THE INSTRUCTIONS FOR THE HUMALOG RX. NEEDS TO KNOW HOE MANY UNITS A DAY.

## 2022-04-04 ENCOUNTER — OFFICE VISIT (OUTPATIENT)
Dept: ENDOCRINOLOGY | Age: 32
End: 2022-04-04

## 2022-04-04 VITALS
WEIGHT: 168.4 LBS | DIASTOLIC BLOOD PRESSURE: 78 MMHG | OXYGEN SATURATION: 98 % | HEART RATE: 87 BPM | SYSTOLIC BLOOD PRESSURE: 104 MMHG | BODY MASS INDEX: 26.43 KG/M2 | HEIGHT: 67 IN

## 2022-04-04 DIAGNOSIS — E10.9 TYPE 1 DIABETES MELLITUS WITHOUT COMPLICATION: ICD-10-CM

## 2022-04-04 DIAGNOSIS — E10.65 TYPE 1 DIABETES MELLITUS WITH HYPERGLYCEMIA: Primary | ICD-10-CM

## 2022-04-04 PROCEDURE — 99204 OFFICE O/P NEW MOD 45 MIN: CPT | Performed by: INTERNAL MEDICINE

## 2022-04-04 RX ORDER — INSULIN LISPRO 100 [IU]/ML
INJECTION, SOLUTION INTRAVENOUS; SUBCUTANEOUS
Qty: 15 ML | Refills: 5
Start: 2022-04-04 | End: 2022-06-29 | Stop reason: SDUPTHER

## 2022-04-04 NOTE — PROGRESS NOTES
Chief Complaint  Diabetes (TYPE 1)    Subjective        Referral from Prem Fairbanks DO appreciated.     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   Duration of diabetes: New onset 2021 with symptoms of polyuria and polydipsia.  She presented to Dr. Fairbanks office.  Lab work indicated hemoglobin A1c 15%.  She was placed on insulin as seen below.  She was also referred to diabetes educator/nutritionist.  Since last visit : At Dr. Fairbanks office she has started her insulin.  Basaglar 20 units every morning    Humalog ICR 1:20 and Correction 1:40 , Target 120 mg/dl/  Monitoring glucose:  CGM use: Interested.  Today we will place a freestyle denise to  Glucometer use: Kwan Contour, downloaded today.  How often patient is testin.8 times per day  In the last month lowest glucose: 70  In the last month highest glucose: 400  Average blood glucose 219±79.  Symptoms:  Hypoglycemia: rarely.  Hyperglycemia: post prandial  Usually run mid 150-200's. Runs higher in the morning.   Peripheral neuropathy symptoms of paresthesias burning numbness tingling:none  Vision changes:none  Skin changes or ulcers:none  Polyuria:none  Polydipsia:none  Gastroparesis symptoms:none  Prevention:  Last eye examination due  Last podiatry visit none    History of heart disease:none  History of kidney disease:none  History of stroke:none    History of hypertension none  History of hyperlipidemia- none    Review of Systems - 14 systems reviewed, Negative except as above.    Past Medical History:   Diagnosis Date   • Allergies    • GERD (gastroesophageal reflux disease)    • Type 1 diabetes mellitus (HCC)      Past Surgical History:   Procedure Laterality Date   • WISDOM TOOTH EXTRACTION       Social History     Socioeconomic History   • Marital status:    Tobacco Use   • Smoking status: Never Smoker   •  Smokeless tobacco: Never Used   Substance and Sexual Activity   • Alcohol use: Yes     Alcohol/week: 2.0 standard drinks     Types: 2 Standard drinks or equivalent per week   • Drug use: No   • Sexual activity: Yes     Birth control/protection: I.U.D.     Family History   Problem Relation Age of Onset   • Hypercalcemia Father    • Obesity Father    • Hyperlipidemia Father    • Cancer Maternal Grandmother    • Cancer Maternal Grandfather    • Allergies Mother    • No Known Problems Sister    • No Known Problems Brother      Current Outpatient Medications on File Prior to Visit   Medication Sig Dispense Refill   • albuterol (PROVENTIL HFA) 108 (90 Base) MCG/ACT inhaler Inhale 2 puffs Every 4 (Four) Hours As Needed for Wheezing or Shortness of Air. 1 inhaler 0   • Blood Glucose Monitoring Suppl (Contour Next One) kit use as directed 1 kit 0   • Blood Glucose Monitoring Suppl (Contour Next One) kit Use as directed to test blood sugar. 1 kit 0   • glucose blood (Contour Next Test) test strip Use to test blood sugar four times daily as directed. 200 each 6   • glucose blood test strip Use as instructed to test blood sugar four times daily (Patient taking differently: Use as instructed to test blood sugar four times daily) 200 each 5   • Insulin Glargine (BASAGLAR KWIKPEN) 100 UNIT/ML injection pen Inject 7 Units under the skin into the appropriate area as directed Every Night. 3 mL 5   • Insulin Pen Needle (UltiGuard SafePack Pen Needle) 32G X 4 MM misc Use 4 needle 4 times daily for injection into the skin of the abdomen or upper outside of the thighs. Do not reuse needles. 200 each 5   • Lancets Thin misc Use with lancet device to check blood glucose 4 times per day via finger stick. 200 each 5   • omeprazole (priLOSEC) 20 MG capsule Take 1 capsule by mouth Daily. 90 capsule 3     No current facility-administered medications on file prior to visit.     Objective   Vital Signs:   /78   Pulse 87   Ht 170.2 cm  "(67\")   Wt 76.4 kg (168 lb 6.4 oz)   SpO2 98%   BMI 26.38 kg/m²            Physical Exam  Vitals and nursing note reviewed.   HENT:      Head: Normocephalic.      Mouth/Throat:      Mouth: Mucous membranes are moist.   Cardiovascular:      Rate and Rhythm: Normal rate.      Pulses: Normal pulses.      Heart sounds: Normal heart sounds.   Pulmonary:      Effort: Pulmonary effort is normal.      Breath sounds: Normal breath sounds. No wheezing or rhonchi.   Abdominal:      General: Bowel sounds are normal.      Palpations: Abdomen is soft.   Musculoskeletal:         General: No swelling. Normal range of motion.      Cervical back: Normal range of motion and neck supple.   Feet:      Right foot:      Protective Sensation: 10 sites tested. 10 sites sensed.      Skin integrity: Skin integrity normal.      Toenail Condition: Right toenails are normal.      Left foot:      Protective Sensation: 10 sites tested. 10 sites sensed.      Skin integrity: Skin integrity normal.      Toenail Condition: Left toenails are normal.   Skin:     General: Skin is warm and dry.   Neurological:      Mental Status: She is alert and oriented to person, place, and time. Mental status is at baseline.   Psychiatric:         Mood and Affect: Mood normal.         Behavior: Behavior normal.         Judgment: Judgment normal.        Result Review :   The following data was reviewed by: Sharyn Braga MD on 04/04/2022:              Assessment and Plan      32-year-old  female with new onset diabetes presented in December 2021 with hemoglobin A1c of greater than 15%.  Evaluate for type I with a C-peptide and antibodies.   Currently having postprandial hyperglycemia and fasting hyperglycemia.  Multipronged plan  1.  Continue Basaglar 20 units daily  2.  Humalog continue insulin to carb ratio 1:20  3.  Correction 1: 40 for target of 120.  Change to correction 1: 35 with a target of 100  4.  Monitor currently with a Kwan " contour  5.  Monitor with a continuous glucose monitor discussed today.  We will place a free denise to CGM.  Will send PA for same.    Labs ordered today: C-peptide, A1c, TSH  Use Diabetes M for meal humalog  Use calorie lillian  or my fitness pal.  Meals  Food coverage: 1 units per 20 grams of carbs. Example 40 g /20 =2 units   Plus correction scale   Total glucose -100 = x.   X /35 = units of humalog.   If x is 110 then 110 /35= 3.14  Total is 2 units + 3.14= 5.14 =5 units.    Basaglar continue 20 units daily    Diagnoses and all orders for this visit:    1. Type 1 diabetes mellitus with hyperglycemia (HCC) (Primary)  -     C-Peptide; Future  -     TSH  -     Cortisol - AM; Future  -     Glutamic Acid Decarboxylase; Future  -     Anti-islet Cell Antibody; Future  -     Basic Metabolic Panel; Future  -     Discontinue: Continuous Blood Gluc Sensor (FreeStyle Denise 2 Sensor) misc; 1 each Every 14 (Fourteen) Days. Freestyle Denise 14-day sensors one for each 14 day period to check 6 times a day. Dx: E 10.65  Dispense: 6 each; Refill: 4    2. DM (diabetes mellitus), type 2 with neurological complications (HCC)    3. Type 1 diabetes mellitus without complication (HCC)  -     Insulin Lispro, 1 Unit Dial, (HumaLOG KwikPen) 100 UNIT/ML solution pen-injector; Food coverage: 1 units per 20 grams of carbs. 15 min prior to meal Plus scale:   Example 40 g /20 =2 units  Plus scale.   Plus correction scale   Total glucose -100 = x.   X /35 = units of humalog.   If x is 110 then 110 /35= 3.14  Total is 2 units + 3.14= 5.14 =5 units.  Use diabetes M adrianna .  Dispense: 15 mL; Refill: 5      I spent 50 minutes caring for Shirin on this date of service. This time includes time spent by me in the following activities:reviewing tests, obtaining and/or reviewing a separately obtained history, performing a medically appropriate examination and/or evaluation , counseling and educating the patient/family/caregiver and ordering medications, tests,  or procedures  Follow Up   Return in about 2 months (around 6/4/2022) for Recheck.  Patient was given instructions and counseling regarding her condition or for health maintenance advice. Please see specific information pulled into the AVS if appropriate.       4/13/22  Labs ordered above are all unremarkable except ELIZABETH-65 is positive indicating  Type 1 diabetes an autoimmune disorder.   Sharyn Braga MD

## 2022-04-04 NOTE — PATIENT INSTRUCTIONS
32-year-old  female with new onset diabetes presented in December 2021 with hemoglobin A1c of greater than 15%.  Evaluate for type I with a C-peptide.  Currently having postprandial hyperglycemia and fasting hyperglycemia.  Multipronged plan  1.  Continue Basaglar 20 units daily  2.  Humalog continue insulin to carb ratio 1:20  3.  Correction 1: 40 for target of 120.  Change to correction 1: 35 with a target of 100  4.  Monitor currently with a Kwan contour  5.  Monitor with a continuous glucose monitor discussed today.  We will place a free denise to CGM.  Will send PA for same.    Labs ordered today: C-peptide, A1c, TSH  Use Diabetes M for meal humalog  Use calorie lillian  or my fitness pal.  Meals  Food 1 units per 20 grams of carbs. Example 40 g /20 =2 units   Plus correction scale   Total glucose -100 = x.   X /35 = units of humalog.   If x is 110 then 110 /35= 3.14  Total is 2 units + 3.14= 5.14 =5 units.    Basaglar continue 20 units daily

## 2022-04-05 ENCOUNTER — TELEPHONE (OUTPATIENT)
Dept: ENDOCRINOLOGY | Age: 32
End: 2022-04-05

## 2022-04-07 ENCOUNTER — TELEPHONE (OUTPATIENT)
Dept: ENDOCRINOLOGY | Age: 32
End: 2022-04-07

## 2022-04-07 DIAGNOSIS — E10.65 TYPE 1 DIABETES MELLITUS WITH HYPERGLYCEMIA: Primary | ICD-10-CM

## 2022-04-08 ENCOUNTER — LAB (OUTPATIENT)
Dept: LAB | Facility: HOSPITAL | Age: 32
End: 2022-04-08

## 2022-04-08 LAB — TSH SERPL DL<=0.05 MIU/L-ACNC: 2.14 UIU/ML (ref 0.27–4.2)

## 2022-04-08 PROCEDURE — 84443 ASSAY THYROID STIM HORMONE: CPT | Performed by: INTERNAL MEDICINE

## 2022-04-08 PROCEDURE — 86341 ISLET CELL ANTIBODY: CPT | Performed by: INTERNAL MEDICINE

## 2022-04-08 PROCEDURE — 80048 BASIC METABOLIC PNL TOTAL CA: CPT | Performed by: INTERNAL MEDICINE

## 2022-04-08 PROCEDURE — 84681 ASSAY OF C-PEPTIDE: CPT | Performed by: INTERNAL MEDICINE

## 2022-04-08 PROCEDURE — 82533 TOTAL CORTISOL: CPT | Performed by: INTERNAL MEDICINE

## 2022-04-10 RX ORDER — PROCHLORPERAZINE 25 MG/1
SUPPOSITORY RECTAL
Qty: 9 EACH | Refills: 4 | Status: SHIPPED | OUTPATIENT
Start: 2022-04-10

## 2022-04-10 RX ORDER — PROCHLORPERAZINE 25 MG/1
1 SUPPOSITORY RECTAL ONCE
Qty: 1 EACH | Refills: 0 | Status: SHIPPED | OUTPATIENT
Start: 2022-04-10 | End: 2022-04-10

## 2022-04-10 RX ORDER — PROCHLORPERAZINE 25 MG/1
1 SUPPOSITORY RECTAL
Qty: 1 EACH | Refills: 4 | Status: SHIPPED | OUTPATIENT
Start: 2022-04-10

## 2022-04-11 NOTE — TELEPHONE ENCOUNTER
Please call patient and notify that dexcom was approved as a continuous glucose monitor. Freestyle denise 2 was denied.     Please notify [employer] rep to teach her how to use it.

## 2022-06-22 ENCOUNTER — OFFICE VISIT (OUTPATIENT)
Dept: ENDOCRINOLOGY | Age: 32
End: 2022-06-22

## 2022-06-22 VITALS
HEART RATE: 86 BPM | HEIGHT: 67 IN | SYSTOLIC BLOOD PRESSURE: 102 MMHG | BODY MASS INDEX: 26.62 KG/M2 | WEIGHT: 169.6 LBS | TEMPERATURE: 98.5 F | OXYGEN SATURATION: 95 % | DIASTOLIC BLOOD PRESSURE: 60 MMHG

## 2022-06-22 DIAGNOSIS — E10.65 TYPE 1 DIABETES MELLITUS WITH HYPERGLYCEMIA: Primary | ICD-10-CM

## 2022-06-22 DIAGNOSIS — E10.9 TYPE 1 DIABETES MELLITUS WITHOUT COMPLICATION: ICD-10-CM

## 2022-06-22 PROCEDURE — 99214 OFFICE O/P EST MOD 30 MIN: CPT | Performed by: INTERNAL MEDICINE

## 2022-06-22 PROCEDURE — 95251 CONT GLUC MNTR ANALYSIS I&R: CPT | Performed by: INTERNAL MEDICINE

## 2022-06-22 RX ORDER — DEXTROAMPHETAMINE SACCHARATE, AMPHETAMINE ASPARTATE, DEXTROAMPHETAMINE SULFATE AND AMPHETAMINE SULFATE 2.5; 2.5; 2.5; 2.5 MG/1; MG/1; MG/1; MG/1
10 TABLET ORAL 2 TIMES DAILY
COMMUNITY
Start: 2022-06-13 | End: 2022-11-17

## 2022-06-22 RX ORDER — PROCHLORPERAZINE 25 MG/1
1 SUPPOSITORY RECTAL ONCE
Qty: 1 EACH | Refills: 1 | Status: SHIPPED | OUTPATIENT
Start: 2022-06-22 | End: 2022-06-22

## 2022-06-22 NOTE — PROGRESS NOTES
Chief Complaint  Diabetes (Type 1)    Subjective        Referral from Prem Fairbanks DO appreciated.     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   Duration of diabetes: New onset December 2021 with symptoms of polyuria and polydipsia.      She presented to Dr. Fairbanks office.  Lab work indicated hemoglobin A1c 15%.    She was placed on insulin as seen below.  She was also referred to diabetes educator/nutritionist.  Since last visit : At Dr. Fairbanks office she has started her insulin.    6/22/22 OV:  Since last visit at this office she has had no hospitalizations . She is carbohydrate counting and using phone adrianna to calculate mealtime insulin.   She has been wearing her CGM for monitoring glucose . The report is as below. She denies hypoglycemia. She feels better.   She was having postprandial hyperglycemia and fasting hyperglycemia.  Multipronged plan  1.   Basaglar 20 units daily  2.  Humalog continue insulin to carb ratio 1:20  3.  Correction  correction 1: 35 with a target of 100 change to 1:30 correction  4.  Monitor currently with a Kwan contour  5.  Monitor with a continuous glucose monitor discussed today.  We will place a free denise to CGM.  Will send PA for same.  .   Use Diabetes M for meal humalog  Use calorie lillian  or my fitness pal.    Monitoring glucose:  CGM use: Insurance approved DEXCOM  Glucometer use: Kwan Contour  How often patient is testing: CGM ac hs  times per day  In the last month lowest glucose: 70  In the last month highest glucose:300  Average blood glucose 194 [ improved compared to 219]  Symptoms:  Hypoglycemia: rarely.  Hyperglycemia: post prandial  Usually run mid 150-200's. Runs higher in the morning.   Peripheral neuropathy symptoms of paresthesias burning numbness tingling:none  Vision changes:none  Skin  changes or ulcers:none  Polyuria:none  Polydipsia:none  Gastroparesis symptoms:none  Prevention:  Last eye examination due  Last podiatry visit none    History of heart disease:none  History of kidney disease:none  History of stroke:none    History of hypertension none  History of hyperlipidemia- none    Review of Systems - 14 systems reviewed, Negative except as above.    Past Medical History:   Diagnosis Date   • Allergies    • GERD (gastroesophageal reflux disease)    • Type 1 diabetes mellitus (HCC)      Past Surgical History:   Procedure Laterality Date   • WISDOM TOOTH EXTRACTION       Social History     Socioeconomic History   • Marital status:    Tobacco Use   • Smoking status: Never Smoker   • Smokeless tobacco: Never Used   Substance and Sexual Activity   • Alcohol use: Yes     Alcohol/week: 2.0 standard drinks     Types: 2 Standard drinks or equivalent per week   • Drug use: No   • Sexual activity: Yes     Birth control/protection: I.U.D.     Family History   Problem Relation Age of Onset   • Hypercalcemia Father    • Obesity Father    • Hyperlipidemia Father    • Cancer Maternal Grandmother    • Cancer Maternal Grandfather    • Allergies Mother    • No Known Problems Sister    • No Known Problems Brother      Current Outpatient Medications on File Prior to Visit   Medication Sig Dispense Refill   • albuterol (PROVENTIL HFA) 108 (90 Base) MCG/ACT inhaler Inhale 2 puffs Every 4 (Four) Hours As Needed for Wheezing or Shortness of Air. 1 inhaler 0   • amphetamine-dextroamphetamine (ADDERALL) 10 MG tablet Take 10 mg by mouth 2 (Two) Times a Day.     • Blood Glucose Monitoring Suppl (Contour Next One) kit use as directed 1 kit 0   • Blood Glucose Monitoring Suppl (Contour Next One) kit Use as directed to test blood sugar. 1 kit 0   • Continuous Blood Gluc Sensor (Dexcom G6 Sensor) Use as directed and replace every 10 days. (Patient taking differently: Use as directed and replace every 10 days.) 9 each  "4   • Continuous Blood Gluc Transmit (Dexcom G6 Transmitter) misc Use as directed. 1 each 4   • glucose blood (Contour Next Test) test strip Use to test blood sugar four times daily as directed. 200 each 6   • glucose blood test strip Use as instructed to test blood sugar four times daily (Patient taking differently: Use as instructed to test blood sugar four times daily) 200 each 5   • Insulin Glargine (BASAGLAR KWIKPEN) 100 UNIT/ML injection pen Inject 7 Units under the skin into the appropriate area as directed Every Night. 3 mL 5   • Insulin Lispro, 1 Unit Dial, (HumaLOG KwikPen) 100 UNIT/ML solution pen-injector Food coverage: 1 units per 20 grams of carbs. 15 min prior to meal Plus scale:   Example 40 g /20 =2 units  Plus scale.   Plus correction scale   Total glucose -100 = x.   X /35 = units of humalog.   If x is 110 then 110 /35= 3.14  Total is 2 units + 3.14= 5.14 =5 units.  Use diabetes M adrianna . 15 mL 5   • Insulin Pen Needle (UltiGuard SafePack Pen Needle) 32G X 4 MM misc Use 4 needle 4 times daily for injection into the skin of the abdomen or upper outside of the thighs. Do not reuse needles. 200 each 5   • Lancets Thin misc Use with lancet device to check blood glucose 4 times per day via finger stick. 200 each 5   • omeprazole (priLOSEC) 20 MG capsule Take 1 capsule by mouth Daily. 90 capsule 3     No current facility-administered medications on file prior to visit.     Objective   Vital Signs:   /60   Pulse 86   Temp 98.5 °F (36.9 °C)   Ht 170.2 cm (67.01\")   Wt 76.9 kg (169 lb 9.6 oz)   SpO2 95%   BMI 26.56 kg/m²            Physical Exam  Vitals and nursing note reviewed.   HENT:      Head: Normocephalic.      Mouth/Throat:      Mouth: Mucous membranes are moist.   Pulmonary:      Breath sounds: No wheezing or rhonchi.   Musculoskeletal:         General: No swelling. Normal range of motion.   Feet:      Right foot:      Protective Sensation: 10 sites tested. 10 sites sensed.      Skin " integrity: Skin integrity normal.      Toenail Condition: Right toenails are normal.      Left foot:      Protective Sensation: 10 sites tested. 10 sites sensed.      Skin integrity: Skin integrity normal.      Toenail Condition: Left toenails are normal.   Skin:     General: Skin is warm and dry.   Neurological:      Mental Status: She is alert and oriented to person, place, and time. Mental status is at baseline.   Psychiatric:         Mood and Affect: Mood normal.         Behavior: Behavior normal.         Judgment: Judgment normal.        Result Review :   The following data was reviewed by: Sharyn Braga MD on :06/22/22:      Continuous glucose monitor   Report Dexcom clarity  14 days: June 9, 2022-June 22, 2022  Average glucose: 194  GMI: 8.0%  Standard deviation: 57  Time in range mg/dl  15% very high (150 or greater)  44% high (181-250)  40% in range () LOV/initial visit:  <1% low  0% very low  My impression this report is as follows:  Sensor usage 13/14 days or 93% of the time sensor in place.  Overnight hypoglycemia was not identified.  Daytime hypoglycemia was not identified.  Overnight hyperglycemia is present with greatest glucose occurring at midnight.  Daytime hyperglycemia is continuous with greatest glucose after meals.    Labs ordered : C-peptide, A1c, TSH.Labs are unremarkable except that the positive ELIZABETH-65 antibody indicates that this molecule attacked and killed most of your insulin producing beta cells. This is type 1 diabetes .      Currently having postprandial hyperglycemia and fasting hyperglycemia.  Multipronged plan  1.  Continue Basaglar 20 units daily  2.  Humalog continue insulin to carb ratio 1:20  3.  Correction 1: 40 for target of 120.  Change to correction 1: 35 with a target of 100  4.  Monitor currently with a Kwan contour  5.  Monitor with a continuous glucose monitor discussed today.  We will place a free denise to CGM.  Will send PA for same.    Labs ordered :  C-peptide, A1c, TSH.Labs are unremarkable except that the positive ELIZABETH-65 antibody indicates that this molecule attacked and killed most of your insulin producing beta cells. This is type 1 diabetes .     Meals  Food coverage: 1 units per 20 grams of carbs. Example 40 g /20 =2 units   Plus correction scale   Total glucose -100 = x.   X /35 = units of humalog.   If x is 110 then 110 /35= 3.14  Total is 2 units + 3.14= 5.14 =5 units.    Basaglar continue 20 units daily          Component      Latest Ref Rng & Units 4/8/2022 4/8/2022 4/8/2022 4/8/2022           7:50 AM  7:50 AM  7:50 AM  7:50 AM   Glucose      65 - 99 mg/dL 203 (H)      BUN      6 - 20 mg/dL 9      Creatinine      0.57 - 1.00 mg/dL 0.71      Sodium      136 - 145 mmol/L 138      Potassium      3.5 - 5.2 mmol/L 4.2      Chloride      98 - 107 mmol/L 101      CO2      22.0 - 29.0 mmol/L 25.7      Calcium      8.6 - 10.5 mg/dL 9.5      BUN/Creatinine Ratio      7.0 - 25.0 12.7      Anion Gap      5.0 - 15.0 mmol/L 11.3      eGFR      >60.0 mL/min/1.73 114.6      TSH Baseline      0.270 - 4.200 uIU/mL  2.140     Cortisol      mcg/dL   17.70    C-Peptide      1.1 - 4.4 ng/mL    1.1   ELIZABETH-65      0.0 - 5.0 U/mL         Component      Latest Ref Rng & Units 4/8/2022 4/8/2022           7:50 AM  7:50 AM   Islet Cell Ab      Neg:<1:1 Negative    ELIZABETH-65      0.0 - 5.0 U/mL  166.4 (H)     Assessment and Plan      32-year-old  female with new onset L ADA, type 1 diabetes presented in December 2021 with hemoglobin A1c of greater than 15%.  Evaluate for type I with a C-peptide and antibodies demonstrates positive ELIZABETH-65. 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    Type 1 diabetes has significantly improved.  Initial HG A1c was 15%.  On current CGM the GMI is 8%.  If her glucose control continues as well as she is doing at this visit then her A1c will continue to drop closer to 8%  at next blood draw.    Currently having fasting and postprandial hyperglycemia much improved.  Correction scale adjusted.  Multipronged plan  1.  Continue Basaglar 20 units daily  2.  Humalog continue insulin to carb ratio 1:20  3. Correction change 1:35 to 1:30 with a target of 100  4.  Monitor currently with a Kwan contour  5.  Monitor with a continuous glucose monitor discussed today.        Diagnoses and all orders for this visit:    1. Type 1 diabetes mellitus with hyperglycemia (HCC) (Primary)  -     Continuous Blood Gluc  (Dexcom G6 ) device; Use to check blood glucose 6 times per day.  Dispense: 1 each; Refill: 1  -     Hemoglobin A1c  -     Comprehensive Metabolic Panel  -     Lipid Panel  -     Microalbumin / Creatinine Urine Ratio - Urine, Clean Catch    Other orders  -     Cardiovascular Risk Assessment      I spent 25 minutes caring for Shirin on this date of service. This time includes time spent by me in the following activities:reviewing tests, obtaining and/or reviewing a separately obtained history, performing a medically appropriate examination and/or evaluation , counseling and educating the patient/family/caregiver and ordering medications, tests, or procedures  Follow Up   Return in about 6 weeks (around 8/3/2022).  Patient was given instructions and counseling regarding her condition or for health maintenance advice. Please see specific information pulled into the AVS if appropriate.       Sharyn Braga MD

## 2022-06-23 LAB
ALBUMIN SERPL-MCNC: 4.8 G/DL (ref 3.8–4.8)
ALBUMIN/CREAT UR: <10 MG/G CREAT (ref 0–29)
ALBUMIN/GLOB SERPL: 2.4 {RATIO} (ref 1.2–2.2)
ALP SERPL-CCNC: 80 IU/L (ref 44–121)
ALT SERPL-CCNC: 23 IU/L (ref 0–32)
AST SERPL-CCNC: 19 IU/L (ref 0–40)
BILIRUB SERPL-MCNC: 0.3 MG/DL (ref 0–1.2)
BUN SERPL-MCNC: 13 MG/DL (ref 6–20)
BUN/CREAT SERPL: 17 (ref 9–23)
CALCIUM SERPL-MCNC: 9.6 MG/DL (ref 8.7–10.2)
CHLORIDE SERPL-SCNC: 103 MMOL/L (ref 96–106)
CHOLEST SERPL-MCNC: 169 MG/DL (ref 100–199)
CO2 SERPL-SCNC: 23 MMOL/L (ref 20–29)
CREAT SERPL-MCNC: 0.77 MG/DL (ref 0.57–1)
CREAT UR-MCNC: 30.1 MG/DL
EGFRCR SERPLBLD CKD-EPI 2021: 104 ML/MIN/1.73
GLOBULIN SER CALC-MCNC: 2 G/DL (ref 1.5–4.5)
GLUCOSE SERPL-MCNC: 119 MG/DL (ref 65–99)
HBA1C MFR BLD: 8.3 % (ref 4.8–5.6)
HDLC SERPL-MCNC: 68 MG/DL
IMP & REVIEW OF LAB RESULTS: NORMAL
LDLC SERPL CALC-MCNC: 87 MG/DL (ref 0–99)
MICROALBUMIN UR-MCNC: <3 UG/ML
POTASSIUM SERPL-SCNC: 4.3 MMOL/L (ref 3.5–5.2)
PROT SERPL-MCNC: 6.8 G/DL (ref 6–8.5)
SODIUM SERPL-SCNC: 139 MMOL/L (ref 134–144)
TRIGL SERPL-MCNC: 71 MG/DL (ref 0–149)
VLDLC SERPL CALC-MCNC: 14 MG/DL (ref 5–40)

## 2022-06-26 DIAGNOSIS — E10.9 TYPE 1 DIABETES MELLITUS WITHOUT COMPLICATION: ICD-10-CM

## 2022-06-27 RX ORDER — INSULIN GLARGINE 100 [IU]/ML
7 INJECTION, SOLUTION SUBCUTANEOUS NIGHTLY
Qty: 3 ML | Refills: 5 | OUTPATIENT
Start: 2022-06-27

## 2022-06-29 ENCOUNTER — TELEPHONE (OUTPATIENT)
Dept: ENDOCRINOLOGY | Age: 32
End: 2022-06-29

## 2022-06-29 DIAGNOSIS — E10.9 TYPE 1 DIABETES MELLITUS WITHOUT COMPLICATION: ICD-10-CM

## 2022-06-29 RX ORDER — INSULIN LISPRO 100 [IU]/ML
50 INJECTION, SOLUTION INTRAVENOUS; SUBCUTANEOUS DAILY
Qty: 45 ML | Refills: 1 | Status: SHIPPED | OUTPATIENT
Start: 2022-06-29 | End: 2022-07-07 | Stop reason: ALTCHOICE

## 2022-06-29 RX ORDER — INSULIN GLARGINE 100 [IU]/ML
50 INJECTION, SOLUTION SUBCUTANEOUS NIGHTLY
Qty: 15 PEN | Refills: 1 | Status: SHIPPED | OUTPATIENT
Start: 2022-06-29 | End: 2022-07-07 | Stop reason: ALTCHOICE

## 2022-06-29 RX ORDER — INSULIN LISPRO 100 [IU]/ML
INJECTION, SOLUTION INTRAVENOUS; SUBCUTANEOUS
Qty: 15 ML | Refills: 5 | Status: SHIPPED | OUTPATIENT
Start: 2022-06-29 | End: 2022-06-29 | Stop reason: SDUPTHER

## 2022-06-29 RX ORDER — INSULIN GLARGINE 100 [IU]/ML
20 INJECTION, SOLUTION SUBCUTANEOUS NIGHTLY
Qty: 15 ML | Refills: 5 | Status: SHIPPED | OUTPATIENT
Start: 2022-06-29 | End: 2022-06-29 | Stop reason: SDUPTHER

## 2022-06-29 RX ORDER — FLURBIPROFEN SODIUM 0.3 MG/ML
SOLUTION/ DROPS OPHTHALMIC
Qty: 200 EACH | Refills: 5 | Status: SHIPPED | OUTPATIENT
Start: 2022-06-29

## 2022-07-06 ENCOUNTER — TELEPHONE (OUTPATIENT)
Dept: ENDOCRINOLOGY | Age: 32
End: 2022-07-06

## 2022-07-06 NOTE — TELEPHONE ENCOUNTER
PATIENT CALLED INSURANCE IS NOT COVERING LISPRO AND BASAGLAR. PLEASE RESEND SCRIPTS FOR NOVOLOG AND SEMGLEE.    SEND TO:  [facility]    CALL PATIENT BACK WITH ANY QUESTIONS.

## 2022-07-07 RX ORDER — INSULIN ASPART 100 [IU]/ML
50 INJECTION, SOLUTION INTRAVENOUS; SUBCUTANEOUS DAILY
Qty: 45 ML | Refills: 1 | Status: SHIPPED | OUTPATIENT
Start: 2022-07-07 | End: 2023-06-29

## 2022-07-07 RX ORDER — INSULIN GLARGINE-YFGN 100 [IU]/ML
50 INJECTION, SOLUTION SUBCUTANEOUS DAILY
Qty: 45 ML | Refills: 1 | Status: SHIPPED | OUTPATIENT
Start: 2022-07-07 | End: 2023-01-29

## 2022-11-17 ENCOUNTER — OFFICE VISIT (OUTPATIENT)
Dept: ENDOCRINOLOGY | Age: 32
End: 2022-11-17

## 2022-11-17 VITALS
HEART RATE: 93 BPM | HEIGHT: 67 IN | RESPIRATION RATE: 16 BRPM | WEIGHT: 176 LBS | OXYGEN SATURATION: 99 % | BODY MASS INDEX: 27.62 KG/M2 | DIASTOLIC BLOOD PRESSURE: 80 MMHG | SYSTOLIC BLOOD PRESSURE: 120 MMHG

## 2022-11-17 DIAGNOSIS — E55.9 VITAMIN D DEFICIENCY: ICD-10-CM

## 2022-11-17 DIAGNOSIS — E10.65 TYPE 1 DIABETES MELLITUS WITH HYPERGLYCEMIA: Primary | ICD-10-CM

## 2022-11-17 PROBLEM — M25.529 ARTHRALGIA OF ELBOW: Status: ACTIVE | Noted: 2022-11-17

## 2022-11-17 PROBLEM — E13.9 DIABETES 1.5, MANAGED AS TYPE 1 (HCC): Status: ACTIVE | Noted: 2021-12-08

## 2022-11-17 PROCEDURE — 99214 OFFICE O/P EST MOD 30 MIN: CPT | Performed by: INTERNAL MEDICINE

## 2022-11-17 NOTE — PROGRESS NOTES
Chief Complaint  Diabetes    Subjective        Referral from Prem Fairbanks DO appreciated.     Shirin comes for follow-up for type 1 diabetes/IRMA.    Interval history negative for any new symptoms, ER visits or hospitalizations.    The patient continues to maintain a carb restricted diet.  She consumes 35-40 carbs per meal and has 3 meals a day.  She rarely snacks.  She does not have an exercise routine.  She is currently on the following insulin regimen:  1.  Basaglar 20 units daily  2.  Humalog ICR 1:20 and correction change 1:35 to 1:30 with a target of 100    Dexcom CGM av glucose 212 mg/dL with SD 60 mg/dL.  She is in target range 31%, high 45% and very high 23%.  She has a low<1%.    He rmost recent comprehensive diabetic eye exam was sometime in/around April/May of 2022.  Patient reports the exam was normal.    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   Duration of diabetes: New onset December 2021 with symptoms of polyuria and polydipsia.      She presented to Dr. Fairbanks office.  Lab work indicated hemoglobin A1c 15%.    She was placed on insulin as seen below.  She was also referred to diabetes educator/nutritionist.  Since last visit : At Dr. Fairbanks office she has started her insulin.    6/22/22 OV:  Since last visit at this office she has had no hospitalizations . She is carbohydrate counting and using phone adrianna to calculate mealtime insulin.   She has been wearing her CGM for monitoring glucose . The report is as below. She denies hypoglycemia. She feels better.   She was having postprandial hyperglycemia and fasting hyperglycemia.  Multipronged plan  1.   Basaglar 20 units daily  2.  Humalog continue insulin to carb ratio 1:20  3.  Correction  correction 1: 35 with a target of 100 change to 1:30 correction  4.  Monitor currently with a [employer]  contour  5.  Monitor with a continuous glucose monitor discussed today.  We will place a free denise to CGM.  Will send PA for same.  .   Use Diabetes M for meal humalog  Use calorie lillian  or my fitness pal.    Monitoring glucose:  CGM use: Insurance approved DEXCOM  Glucometer use: Kwan Contour  How often patient is testing: CGM ac hs  times per day  In the last month lowest glucose: 70  In the last month highest glucose:300  Average blood glucose 194 [ improved compared to 219]  Symptoms:  Hypoglycemia: rarely.  Hyperglycemia: post prandial  Usually run mid 150-200's. Runs higher in the morning.   Peripheral neuropathy symptoms of paresthesias burning numbness tingling:none  Vision changes:none  Skin changes or ulcers:none  Polyuria:none  Polydipsia:none  Gastroparesis symptoms:none  Prevention:  Last eye examination due  Last podiatry visit none    History of heart disease:none  History of kidney disease:none  History of stroke:none    History of hypertension none  History of hyperlipidemia- none    Review of Systems - 14 systems reviewed, Negative except as above.    Past Medical History:   Diagnosis Date   • Allergies    • GERD (gastroesophageal reflux disease)    • Type 1 diabetes mellitus (HCC)      Past Surgical History:   Procedure Laterality Date   • WISDOM TOOTH EXTRACTION       Social History     Socioeconomic History   • Marital status:    Tobacco Use   • Smoking status: Never   • Smokeless tobacco: Never   Substance and Sexual Activity   • Alcohol use: Yes     Alcohol/week: 2.0 standard drinks     Types: 2 Standard drinks or equivalent per week   • Drug use: No   • Sexual activity: Yes     Birth control/protection: I.U.D.     Family History   Problem Relation Age of Onset   • Hypercalcemia Father    • Obesity Father    • Hyperlipidemia Father    • Cancer Maternal Grandmother    • Cancer Maternal Grandfather    • Allergies Mother    • No Known Problems Sister    • No Known Problems Brother       Current Outpatient Medications on File Prior to Visit   Medication Sig Dispense Refill   • albuterol (PROVENTIL HFA) 108 (90 Base) MCG/ACT inhaler Inhale 2 puffs Every 4 (Four) Hours As Needed for Wheezing or Shortness of Air. 1 inhaler 0   • amphetamine-dextroamphetamine (Adderall) 20 MG tablet Take 0.5 tablet by mouth twice daily 30 tablet 0   • Blood Glucose Monitoring Suppl (Contour Next One) kit use as directed 1 kit 0   • Blood Glucose Monitoring Suppl (Contour Next One) kit Use as directed to test blood sugar. 1 kit 0   • Continuous Blood Gluc Sensor (Dexcom G6 Sensor) Use as directed and replace every 10 days. (Patient taking differently: Use as directed and replace every 10 days.) 9 each 4   • Continuous Blood Gluc Transmit (Dexcom G6 Transmitter) misc Use as directed. 1 each 4   • glucose blood (Contour Next Test) test strip Use to test blood sugar four times daily as directed. 200 each 6   • glucose blood test strip Use as instructed to test blood sugar four times daily (Patient taking differently: Use as instructed to test blood sugar four times daily) 200 each 5   • Insulin Glargine-yfgn 100 UNIT/ML solution pen-injector Inject 50 Units under the skin into the appropriate area as directed Daily for 90 days. 45 mL 1   • Insulin Pen Needle (UltiGuard SafePack Pen Needle) 32G X 4 MM misc Use 4 needle 4 times daily for injection into the skin of the abdomen or upper outside of the thighs. Do not reuse needles. 200 each 5   • Lancets Thin misc Use with lancet device to check blood glucose 4 times per day via finger stick. 200 each 5   • omeprazole (priLOSEC) 20 MG capsule Take 1 capsule by mouth Daily. 90 capsule 3   • insulin aspart (NovoLOG FlexPen) 100 UNIT/ML solution pen-injector sc pen Inject 50 Units under the skin daily as directed. 50 units a day in divided doses 10 to 15 minutes prior to meals as directed with insulin to carb ratio 1:20, correction 1: 30, target 45 mL 1   • [DISCONTINUED]  "amphetamine-dextroamphetamine (ADDERALL) 10 MG tablet Take 10 mg by mouth 2 (Two) Times a Day.     • [DISCONTINUED] amphetamine-dextroamphetamine (Adderall) 20 MG tablet Take 0.5 tablets by mouth 2 (Two) Times a Day. 30 tablet 0     No current facility-administered medications on file prior to visit.     Objective   Vital Signs:   /80   Pulse 93   Resp 16   Ht 170.2 cm (67\")   Wt 79.8 kg (176 lb)   SpO2 99%   BMI 27.57 kg/m²            Physical Exam  Vitals and nursing note reviewed.   HENT:      Head: Normocephalic.      Mouth/Throat:      Mouth: Mucous membranes are moist.   Pulmonary:      Breath sounds: No wheezing or rhonchi.   Musculoskeletal:         General: No swelling. Normal range of motion.   Feet:      Right foot:      Protective Sensation: 10 sites tested. 10 sites sensed.      Skin integrity: Skin integrity normal.      Toenail Condition: Right toenails are normal.      Left foot:      Protective Sensation: 10 sites tested. 10 sites sensed.      Skin integrity: Skin integrity normal.      Toenail Condition: Left toenails are normal.   Skin:     General: Skin is warm and dry.   Neurological:      Mental Status: She is alert and oriented to person, place, and time. Mental status is at baseline.   Psychiatric:         Mood and Affect: Mood normal.         Behavior: Behavior normal.         Judgment: Judgment normal.        Result Review :   The following data was reviewed by: Nikki Herring MD on :06/22/22:      Continuous glucose monitor   Report Dexcom clarity  14 days: June 9, 2022-June 22, 2022  Average glucose: 194  GMI: 8.0%  Standard deviation: 57  Time in range mg/dl  15% very high (150 or greater)  44% high (181-250)  40% in range () LOV/initial visit:  <1% low  0% very low  My impression this report is as follows:  Sensor usage 13/14 days or 93% of the time sensor in place.  Overnight hypoglycemia was not identified.  Daytime hypoglycemia was not identified.  Overnight " hyperglycemia is present with greatest glucose occurring at midnight.  Daytime hyperglycemia is continuous with greatest glucose after meals.    Labs ordered : C-peptide, A1c, TSH.Labs are unremarkable except that the positive ELIZABETH-65 antibody indicates that this molecule attacked and killed most of your insulin producing beta cells. This is type 1 diabetes .      Currently having postprandial hyperglycemia and fasting hyperglycemia.  Multipronged plan  1.  Continue Basaglar 20 units daily-  2.  Humalog continue insulin to carb ratio 1:20  3.  Correction 1: 40 for target of 120.  Change to correction 1: 35 with a target of 100  4.  Monitor currently with a Kwan contour  5.  Monitor with a continuous glucose monitor discussed today.  We will place a free denise to CGM.  Will send PA for same.    Labs ordered : C-peptide, A1c, TSH.Labs are unremarkable except that the positive ELIZABETH-65 antibody indicates that this molecule attacked and killed most of your insulin producing beta cells. This is type 1 diabetes .     Meals  Food coverage: 1 units per 20 grams of carbs. Example 40 g /20 =2 units   Plus correction scale   Total glucose -100 = x.   X /35 = units of humalog.   If x is 110 then 110 /35= 3.14  Total is 2 units + 3.14= 5.14 =5 units.    Basaglar continue 20 units daily          Component      Latest Ref Rng & Units 4/8/2022 4/8/2022 4/8/2022 4/8/2022           7:50 AM  7:50 AM  7:50 AM  7:50 AM   Glucose      65 - 99 mg/dL 203 (H)      eGFR      >60.0 mL/min/1.73 114.6      TSH Baseline      0.270 - 4.200 uIU/mL  2.140     Cortisol      mcg/dL   17.70    C-Peptide      1.1 - 4.4 ng/mL    1.1   ELIZABETH-65      0.0 - 5.0 U/mL         Component      Latest Ref Rng & Units 4/8/2022 4/8/2022           7:50 AM  7:50 AM   Islet Cell Ab      Neg:<1:1 Negative    ELIZABETH-65      0.0 - 5.0 U/mL  166.4 (H)     Assessment and Plan      32-year-old  female with new onset  IRMA, type 1 diabetes presented in December 2021 with  hemoglobin A1c of greater than 15%.  Evaluate for type I with a C-peptide and antibodies demonstrates positive ELIZABETH-65. 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    Type 1 diabetes has significantly improved.   Initial HG A1c was 15% and now most recent HbA1c from 6/22/2022 is 8.3%.   Her glucose mostly remains above target range.    Recommend:  1. Continue to monitor glucose with CGM.  2. Maintain your current carb restricted diet.  3. Initiate an exercise routine of 30 minutes or more every day as directed.  4. Diabetes regimen:  - Continue Basaglar 20 units daily.  Every 3 days if average fasting blood sugar is > 120 mg/dL, increase Basaglar by 2 units.  Continue to titrate up every 3 days until fasting blood sugar wa30-815 mg/dL.   - Humalog continue insulin to carb ratio 1:20 and correction 1:30 with a target of 100  5. We will refer the patient to diabetes education to review different insulin pumps available.  6. Diabetic foot care as directed.  7. Continue yearly diabetic eye exam.  8. Return to clinic in 3 months, with labs 1 week prior to the follow-up visit. We will also do labs today.      Diagnoses and all orders for this visit:    1. Type 1 diabetes mellitus with hyperglycemia (HCC) (Primary)  -     POC Glucose Fingerstick  -     Ambulatory Referral to Diabetic Education  -     Hemoglobin A1c; Future  -     Comprehensive Metabolic Panel; Future  -     Lipid Panel; Future  -     CBC Auto Differential; Future  -     Fructosamine; Future  -     Microalbumin / Creatinine Urine Ratio - Urine, Clean Catch; Future  -     Vitamin D,25-Hydroxy; Future  -     TSH; Future  -     Hemoglobin A1c; Future  -     Comprehensive Metabolic Panel; Future  -     Lipid Panel; Future  -     Microalbumin / Creatinine Urine Ratio - Urine, Clean Catch; Future  -     TSH; Future    2. Vitamin D deficiency  -     Vitamin D,25-Hydroxy; Future      I spent  25 minutes caring for Shirin on this date of service. This time includes time spent by me in the following activities:reviewing tests, obtaining and/or reviewing a separately obtained history, performing a medically appropriate examination and/or evaluation , counseling and educating the patient/family/caregiver and ordering medications, tests, or procedures  Follow Up   Return in about 3 months (around 2/17/2023).  Patient was given instructions and counseling regarding her condition or for health maintenance advice. Please see specific information pulled into the AVS if appropriate.       Nikki Herring MD

## 2022-11-18 ENCOUNTER — LAB (OUTPATIENT)
Dept: LAB | Facility: HOSPITAL | Age: 32
End: 2022-11-18

## 2022-11-18 DIAGNOSIS — E10.65 TYPE 1 DIABETES MELLITUS WITH HYPERGLYCEMIA: ICD-10-CM

## 2022-11-18 PROCEDURE — 80061 LIPID PANEL: CPT | Performed by: INTERNAL MEDICINE

## 2022-11-18 PROCEDURE — 82570 ASSAY OF URINE CREATININE: CPT | Performed by: INTERNAL MEDICINE

## 2022-11-18 PROCEDURE — 80050 GENERAL HEALTH PANEL: CPT | Performed by: INTERNAL MEDICINE

## 2022-11-18 PROCEDURE — 82985 ASSAY OF GLYCATED PROTEIN: CPT | Performed by: INTERNAL MEDICINE

## 2022-11-18 PROCEDURE — 82043 UR ALBUMIN QUANTITATIVE: CPT | Performed by: INTERNAL MEDICINE

## 2022-11-18 PROCEDURE — 82306 VITAMIN D 25 HYDROXY: CPT | Performed by: INTERNAL MEDICINE

## 2022-11-18 PROCEDURE — 83036 HEMOGLOBIN GLYCOSYLATED A1C: CPT | Performed by: INTERNAL MEDICINE

## 2022-12-20 ENCOUNTER — E-VISIT (OUTPATIENT)
Dept: FAMILY MEDICINE CLINIC | Facility: TELEHEALTH | Age: 32
End: 2022-12-20
Payer: COMMERCIAL

## 2022-12-20 PROCEDURE — BRIGHTMDVISIT: Performed by: NURSE PRACTITIONER

## 2022-12-20 NOTE — E-VISIT TREATED
Chief Complaint: Coronavirus (COVID-19), cold, sinus pain, allergy, or flu   Patient introduction   Patient is 32-year-old female with cough, congestion, sore throat, voice hoarseness, and fatigue that started 3 to 5 days ago. Regarding date of symptom onset, patient writes: 12/15/22.   COVID-19 exposure, testing history, and vaccination status:    No known exposure to a person with a confirmed or suspected case of COVID-19.    No recent travel outside of their local community.    Patient had a self-test within the last week. Patient specifies date of test as 12/18/22. Test result was negative.    Received 2 doses of the COVID-19 vaccine (Pfizer, Moderna).   Received their most recent dose of the vaccine more than 14 days ago.   Risk factors for severe disease from COVID-19 infection:    BMI >= 25.    Healthcare worker.    Asthma.    Diabetes.   Patient requests a 3-day excuse note.   General presentation   Symptoms came on gradually.   Fever:    No fever.   Sinus and nasal symptoms:    Green nasal drainage.    Nasal drainage is thick.    Postnasal drip.    Congestion with sinus pain or pressure on or around the forehead and eyes.    Patient first noticed sinus pain less than 5 days ago.    Sinus pain is worse with Valsalva.    No nasal discharge.    No itchy nose or sneezing.    No history of unhealed nasal septal ulcer/nasal wound.    No history of antibiotic treatment for sinus infection in the last year.    No history of deviated septum or nasal polyps.   Throat symptoms:    Sore throat.    No known recent strep exposure.    Patient does not think they have strep.    Patient can swallow liquids and solid foods with ease.    Voice is mildly hoarse. Patient does not believe hoarseness is due to voice strain.   Head and body aches:    Fatigue.    No headache.    No sweats.    No chills.    No myalgia.   Cough:    Cough is worse in the morning.    Cough is productive of sputum.    Describes color of sputum as  green.   Wheezing and shortness of breath:    Has asthma diagnosis.    Current cold symptoms aggravate their asthma.    Using an albuterol inhaler for asthma.    Using albuterol every 4 to 6 hours.    Patient requests a prescription of albuterol in the form of an inhaler.    No COPD diagnosis.    No wheezing.    No shortness of breath.   Chest pain:    Has chest pain, but only when coughing.    Chest pain is not the patient's chief complaint.    [place] Heart Score (MHS): 0, low risk of CAD. Assigning 1 point to each of 5 criteria (female >= 65 years old or male >= 55 years, known CAD, pain worse with exercise, pain not reproducible with palpation, and patient assumes pain is cardiogenic), the MHS is a validated clinical decision rule used to rule out coronary artery disease in primary care patients with chest pain.   Ear symptoms:    Current symptoms include pressure and a plugged or blocked sensation in the ear(s).   Dizziness:    Mild dizziness that does not interfere with daily activities.   Allergies:    Patient has known seasonal allergies.   Flu exposure:    Recent distant-proximity exposure to a person with a confirmed flu diagnosis.    Received a flu vaccine in the last 2 to 4 weeks.   Patient is taking over-the-counter medications for current symptoms, including diphenhydramine, fluticasone, and guaifenesin/dextromethorphan.   Review of red flags/alarm symptoms:    No changes in alertness or awareness.    No symptoms suggesting respiratory distress.    No symptoms suggesting airway obstruction.    No decreased urination.   Risk factors for antibiotic resistance:    Diabetes.   Pregnancy/menstrual status/breastfeeding:    Not pregnant.    Not breastfeeding.    Regarding last menstrual period, patient writes: I have an iud so I do not have a period now.   Self-exam:    Height: 170 centimeters    Weight: 74.8 kilograms    No difficulty moving their chin toward their chest.    No tonsillar edema.    Tonsils  appear normal.    Palatal petechiae.    Neck lymph nodes feel normal.    Has not taken antibiotics for similar symptoms within the past month.   Current medications   Currently taking albuterol sulfate  (90 Base) MCG/ACT inhaler, NovoLOG FlexPen 100 UNIT/ML solution pen-injector sc pen, amphetamine-dextroamphetamine 15 MG tablet, Semglee (yfgn) 100 UNIT/ML solution pen-injector, and omeprazole 20 MG capsule.   Medication allergies   None.   Medication contraindication review   No history of anaphylactic reaction to beta-lactam antibiotics; aspirin triad; blood dyscrasia; bone marrow depression; catecholamine-releasing paraganglioma; coronary artery disease; coagulation disorder; congenital long QT syndrome; depression; electrolyte abnormalities; fungal infection; GI bleeding; GI obstruction; G6PD deficiency; heart arrhythmia; hypertension; mononucleosis; myasthenia; recent myocardial infarction; NSAID-induced asthma/urticaria; Parkinson's disease; pheochromocytoma; porphyria; Reye syndrome; seizure disorder; ulcerative colitis; and urinary retention.   No history of metoclopramide-associated dystonic reaction and tardive dyskinesia.   No known history of amoxicillin-clavulanate-associated cholestatic jaundice or hepatic impairment.   No known history of azithromycin-associated cholestatic jaundice or hepatic impairment.   Past medical history   Immune conditions: No immunocompromising conditions. No history of cancer.   Social history   Patient is a healthcare worker.   Never smoked tobacco.   Assessment   Viral URI, cannot rule out influenza or COVID-19. Ruled out: Traumatic laryngitis.   Plan   Medications:    benzonatate 200 mg capsule RX 200mg 1 cap PO tid PRN 7d for cough. Do not chew or cut these capsules. Amount is 21 cap.    guaifenesin/dextromethorphan 1200 mg/60 mg tablet OTC 1200mg/60mg 1 tab PO bid PRN 10d for cough. Brands to look for include Mucinex DM Maximum Strength. Amount is 20 tab.   The  patient's prescription will be sent to:   [id number]   [address] [place] [id number]   Phone: [phone]     Fax: [phone]   Patient informed to purchase OTC medication.   Other:   Patient was given an excuse note for 3 days.   Education:    Condition and causes    Prevention    Treatment and self-care    When to call provider   ----------   Electronically signed by CANDIDO Navarrete on 2022-12-20 at 13:22PM   ----------   Patient Interview Transcript:   Please carefully consider each question and answer as best you can. This helps your provider give you the best care. Which of these symptoms are bothering you? Select all that apply.    Cough    Stuffed-up nose or sinuses    Sore throat    Hoarse voice or loss of voice    Fatigue or tiredness   Not selected:    Shortness of breath    Fever    Runny nose    Itchy or watery eyes    Itchy nose or sneezing    Loss of smell or taste    Headache    Sweats    Chills    Muscle or body aches    Nausea or vomiting    Diarrhea    I don't have any of these symptoms   Since your current symptoms started, have you been tested for COVID-19? Select one.    Yes   Not selected:    No   When was your most recent COVID-19 test? Select one.    Within the last week (specify date as MM/DD/YY): 12/18/22   Not selected:    7 to 14 days ago    15 to 30 days ago    More than 1 month ago   What type of COVID-19 test did you most recently have? There are two types of COVID-19 tests: - Viral tests check if you're currently infected with COVID-19. For these tests, a nose swab or saliva sample is taken. Viral tests include self-tests and tests done at a doctor's office, lab, or testing site. - Antibody tests check if you've been infected in the past. For these tests, your blood is drawn. Antibody tests can only be done at a doctor's office, lab, or testing site. Select one.    Viral self-test   Not selected:    Viral test at a doctor's office, lab, or testing site    " Antibody test   What was the result of your most recent COVID-19 test? Select one.    Negative   Not selected:    Positive    I'm not sure   Have you gotten the COVID-19 vaccine? Select one.    Yes   Not selected:    No   How many total doses of the COVID-19 vaccine have you gotten? This includes boosters as well as additional doses for those who are immunocompromised. Select one.    2 doses   Not selected:    1 dose    3 doses    4 doses    5 doses   1st dose    Pfizer   Not selected:    J&J/Lidia    Moderna    Novavax   2nd dose    Moderna   Not selected:    J&J/Lidia    Novavax    Pfizer   When did you get your most recent dose of the COVID-19 vaccine?    More than 14 days ago   Not selected:    Less than 48 hours (2 days) ago    48 to 72 hours (3 days) ago    3 to 5 days ago    5 to 7 days ago    7 to 14 days ago   In the last 14 days, have you traveled outside of your local community? This includes travel by car, RV, bus, train, or plane. Travel increases your chances of getting and spreading COVID-19. Select one.    No   Not selected:    Yes   In the last 14 days, have you had close contact with someone who has coronavirus (COVID-19)? \"Close contact\" means any of these: - Living in the same household as someone with COVID-19. - Caring for someone with COVID-19. - Being within 6 feet of someone with COVID-19 for a total of at least 15 minutes over a 24-hour period. For example, three 5-minute exposures for a total of 15 minutes. - Being in direct contact with respiratory droplets from someone with COVID-19 (being coughed on, kissing, sharing utensils). Select one.    No, not that I know of   Not selected:    Yes, a confirmed case    Yes, a suspected case   When did your current symptoms start? Select one.    3 to 5 days ago   Not selected:    Less than 48 hours ago    6 to 9 days ago    10 to 14 days ago    2 to 3 weeks ago    3 to 4 weeks ago    More than a month ago   Do you know the exact date your " symptoms started? If so, enter the date as MM/DD/YY. Select one.    Yes (specify): 12/15/22   Not selected:    No   Did your symptoms come on suddenly or gradually? Select one.    Gradually   Not selected:    Suddenly    I'm not sure   Do you cough so hard that it's made you gag or vomit? By gag, we mean has your coughing made you choke or dry heave? Select all that apply.    No   Not selected:    Yes, my coughing has made me gag    Yes, my coughing has made me vomit   When is your cough the worst? Select all that apply.    In the morning, or when I wake up   Not selected:    During the day    At nighttime, or while I'm sleeping    I haven't noticed a difference depending on time of day   Are you coughing up mucus or phlegm? Select one.    Yes, a lot   Not selected:    No, my cough is dry    Yes, a little   What color is most of the mucus or phlegm that you're coughing up? Select one.    Green   Not selected:    Clear    White/frothy    Yellow    Red or pink    I'm not sure   You mentioned having a stuffy nose or sinus congestion. Do you feel pain or pressure in your sinuses?    Yes   Not selected:    No   Where do you feel sinus pain or pressure?    In my forehead    Around my eyes   Not selected:    Behind my nose    In my cheeks    In my upper teeth or jaw    I'm not sure   When did you first notice your sinus pain or pressure? Select one.    Less than 5 days ago   Not selected:    5 to 9 days ago    10 to 14 days ago    2 to 4 weeks ago    1 month ago or longer   Does coughing, sneezing, or leaning forward make your sinuses feel worse? Select one.    Yes   Not selected:    No   What color is your nasal drainage? Select one.    Green   Not selected:    Clear    White    Yellow    My nose is stuffed but not draining or running    I'm not sure   Is your nasal drainage thick or thin? Select one.    Thick   Not selected:    Thin   Is there any drainage (mucus) going down the back of your throat? This kind of drainage  "is also called \"postnasal drip.\" Select one.    Yes   Not selected:    No, not that I know of   Can you swallow liquids and solid foods? A sore throat may be painful when swallowing, but it shouldn't prevent you from swallowing. Select one.    Yes, with ease   Not selected:    Yes, but it's uncomfortable    Yes, but it's painful    It's hard to swallow anything because it feels like liquids and food get stuck in my throat    No, I can't swallow anything, liquid or solid foods   Since your symptoms started, have you felt dizzy? Select one.    Yes, but I can continue with my regular daily activities   Not selected:    Yes, and it makes it hard to stand, walk, or do daily activities    No   Do you have chest pain? You might also feel it as discomfort, aching, tightness, or squeezing in the chest. Select one.    Yes   Not selected:    No   Which of these is true of your chest pain? Select one.    My chest hurts only when I cough   Not selected:    My chest hurts even when I'm not coughing   Have you urinated at least 3 times in the last 24 hours? Select one.    Yes   Not selected:    No   Changes in alertness or awareness may mean you need emergency care. Since your symptoms started, have you had any of these? Select all that apply.    None of the above   Not selected:    Confusion    Slurred speech    Not knowing where you are or what day it is    Difficulty staying conscious    Fainting or passing out   Do your symptoms include a whistling sound, or wheezing, when you breathe? Select one.    No   Not selected:    Yes    I'm not sure   Early in this interview, you told us you were hoarse or you'd lost your voice. How would you describe the changes to your voice? Select one.    It just sounds a little raspy   Not selected:    It's harder than usual to talk    I can barely talk at all   Is it possible that you strained your voice? Singing, yelling, or talking more or louder than usual can cause voice strain. Select " one.    No, not that I know of   Not selected:    Yes   Do you have any of these symptoms in your ear(s)? Select all that apply.    Pressure    Plugged or blocked sensation   Not selected:    Pain    Fullness    Crackling or popping    None of the above   Can you move your chin toward your chest?    Yes   Not selected:    No, my neck is too stiff   Are your tonsils larger than usual?    No, not that I can tell   Not selected:    Yes    I've had my tonsils removed   Is there any white or yellow pus on your tonsils?    No, not that I can see   Not selected:    Yes   Are there red spots on the roof of your mouth or the back of your throat?    Yes   Not selected:    No, not that I can see   Are your glands/lymph nodes swollen, or does it hurt when you touch them?    No, not that I can tell   Not selected:    Yes   In the past 2 weeks, has anyone around you (such as at school, work, or home) had a confirmed diagnosis of strep throat? A confirmed diagnosis means that a throat swab and lab test were done to verify a strep throat infection. Select one.    No, not that I know of   Not selected:    Yes   Do you think you might have strep throat? Select one.    No   Not selected:    Yes   In the past week, has anyone around you (such as at school, work, or home) had a confirmed diagnosis of the flu? A confirmed diagnosis means that a nose swab was done to verify a flu infection. Select all that apply.    I've been in the same building as someone who has the flu   Not selected:    I live with someone who has the flu    I've been within touching distance of someone who has the flu    I've walked by, or sat about 3 feet away from, someone who has the flu    No, not that I know of   Have you ever been diagnosed with asthma? Select one.    Yes   Not selected:    No   Are your cold symptoms making your asthma worse? Select one.    Yes   Not selected:    No   What medications or inhalers are you currently using for your asthma?  Select all that apply.    Albuterol inhaler, as needed (such as ProAir, Proventil, Ventolin)   Not selected:    Inhaled steroid (such as Qvar, Pulmicort, Flovent)    Inhaled steroid with long-acting bronchodilator (such as Advair, Dulera, Symbicort)    I'm not sure    I'm not taking any medications for my asthma    I usually take medications for my asthma, but I ran out   How often are you using albuterol? If you're using albuterol very frequently, you'll need to be seen in person. Select one.    Every 4 to 6 hours   Not selected:    More than once an hour    Every 1 to 2 hours    Every 2 to 3 hours    Every 3 to 4 hours    Every 6 hours or longer   Do you need a refill of albuterol? Select one.    Yes   Not selected:    No   What form of albuterol do you need? Select one.    Inhaler   Not selected:    Nebulizer solution   Have you ever been diagnosed with chronic obstructive pulmonary disease (COPD)? Select one.    No, not that I know of   Not selected:    Yes   In the past month, have you taken antibiotics for similar symptoms? Examples of antibiotics include amoxicillin, amoxicillin-clavulanate (Augmentin), penicillin, cefdinir (Omnicef), doxycycline, and clindamycin (Cleocin). Select one.    No   Not selected:    Yes   In the last year, how many times were you treated with antibiotics for a sinus infection? Select one.    None   Not selected:    1 to 3 times    4 or more times   Have you been diagnosed with a deviated septum or nasal polyps? The nose is divided into two nostrils by the septum. A crooked septum is called a deviated septum. Nasal polyps are growths inside the nose or sinuses. Select one.    No, not that I know of   Not selected:    Yes, but I had surgery to treat them    Yes, I have a deviated septum    Yes, I have nasal polyps    Yes, I have a deviated septum and nasal polyps   Do you have a sore inside your nose that won't heal? Select one.    No, not that I know of   Not selected:    Yes   Do  you have allergies (pollen, dust mites, mold, animal dander)? Select one.    Yes   Not selected:    No, not that I know of   What kind of allergies do you have? Select all that apply.    Seasonal allergies (hay fever)   Not selected:    Pet allergies    Dust allergies    None of the above    I'm not sure   Do you think your symptoms could be allergy-related? Select one.    I'm not sure   Not selected:    Yes    No   Have you had a flu shot this season? Select one.    Yes, 2 to 4 weeks ago   Not selected:    Yes, less than 2 weeks ago    Yes, 1 to 3 months ago    Yes, 3 to 6 months ago    Yes, more than 6 months ago    No, not that I know of   Are you pregnant? Select one.    No   Not selected:    Yes   When was your last menstrual period? If you don't currently have periods or no longer have periods, please briefly explain.    I have an iud so I do not have a period now   Within the last 2 weeks, have you: - Given birth - Had a miscarriage - Had a pregnancy loss - Had an  Being postpartum (live birth or loss) within the last 2 weeks increases your risk of flu complications. Select one.    No   Not selected:    Yes   Are you breastfeeding? Select one.    No   Not selected:    Yes   The flu and COVID-19 can be more serious for people with certain conditions or characteristics. These questions help us figure out if you or anyone you live with is at higher risk for complications from these infections. Do either of these statements apply to you? Select all that apply.    I'm a healthcare worker   Not selected:    I'm  or Native [place]    None of the above   Do you smoke tobacco? Select one.    No   Not selected:    Yes, every day    Yes, some days    No, I quit   Do you have any of these conditions? Select all that apply.    None of the above   Not selected:    Chronic lung disease, such as cystic fibrosis or interstitial fibrosis    Heart disease, such as congenital heart disease, congestive  heart failure, or coronary artery disease    Disorder of the brain, spinal cord, or nerves and muscles, such as dementia, cerebral palsy, epilepsy, muscular dystrophy, or developmental delay    Metabolic disorder or mitochondrial disease    Cerebrovascular disease, such as stroke or another condition affecting the blood vessels or blood supply to the brain    Down syndrome    Mood disorder, including depression or schizophrenia spectrum disorders    Substance use disorder, such as alcohol, opioid, or cocaine use disorder    Tuberculosis   Do you live in a group care setting? Examples include: - Nursing home - Residential care - Psychiatric treatment facility - Group home - [facility] and [facility] - Homeless shelter - Foster care setting Select one.    No   Not selected:    Yes   Are you a healthcare worker? Select one.    Yes   Not selected:    No   People with a very high body mass index (BMI) are at higher risk for developing complications from the flu and severe illness from COVID-19. To determine your BMI, we need to know your weight and height. Please enter your weight (in pounds).    Weight   Please enter your height.    Height   Do you have any of these conditions that can affect the immune system? Scroll to see all options. Select all that apply.    None of these   Not selected:    History of bone marrow transplant    Chronic kidney disease    Chronic liver disease (including cirrhosis)    HIV/AIDS    Inflammatory bowel disease (Crohn's disease or ulcerative colitis)    Lupus    Moderate to severe plaque psoriasis    Multiple sclerosis    Rheumatoid arthritis    Sickle cell anemia    Alpha or beta thalassemia    History of solid organ transplant (kidney, liver, or heart)    History of spleen removal    An autoimmune disorder not listed here    A condition requiring treatment with long-term use of oral steroids (such as prednisone, prednisolone, or dexamethasone)   Have you ever been diagnosed with  cancer? Select one.    No   Not selected:    Yes, I have cancer now    Yes, but I'm in remission   Do any of these apply to you? Select all that apply.    I have diabetes   Not selected:    I've been hospitalized within the last 5 days    I'm in close contact with a child in     None of the above   Do any of these apply to the people who live with you? Select all that apply.    None of the above   Not selected:    A child under the age of 5    An adult 65 or older    A person who is pregnant    A person who has given birth, had a miscarriage, had a pregnancy loss, or had an  in the last 2 weeks    An  or Native [place]   Does any member of your household have any of these medical conditions? Select all that apply.    None of the above   Not selected:    Asthma    Disorders of the brain, spinal cord, or nerves and muscles, such as dementia, cerebral palsy, epilepsy, muscular dystrophy, or developmental delay    Chronic lung disease, such as COPD or cystic fibrosis    Heart disease, such as congenital heart disease, congestive heart failure, or coronary artery disease    Cerebrovascular disease, such as stroke or another condition affecting the blood vessels or blood supply to the brain    Blood disorders, such as sickle cell disease    Diabetes    Metabolic disorders such as inherited metabolic disorders or mitochondrial disease    Kidney disorders    Liver disorders    Weakened immune system due to illness or medications such as chemotherapy or steroids    Children under the age of 19 who are on long-term aspirin therapy    Extreme obesity (BMI > 40)   Do you have any of these conditions? Scroll to see all options. Select all that apply.    None of the above   Not selected:    Aspirin triad (also known as Samter's triad or ASA triad)    Asthma or hives from taking aspirin or other NSAIDs, such as ibuprofen or naproxen    Blockage or narrowing of the blood vessels of the heart    Blood  dyscrasia, such anemia, leukemia, lymphoma, or myeloma    Bone marrow depression    Catecholamine-releasing paraganglioma    Blood clotting disorder    Congenital long QT syndrome    Depression    Difficulty urinating or completely emptying your bladder    Uncorrected electrolyte abnormalities    Fungal infection    Gastrointestinal (GI) bleeding    Gastrointestinal (GI) obstruction    G6PD deficiency    Recent heart attack    High blood pressure    Irregular heartbeat or heart rhythm    Mononucleosis (mono)    Myasthenia gravis    Parkinson's disease    Pheochromocytoma    Reye syndrome    Seizure disorder    Ulcerative colitis   Have you ever had either of these conditions? Select all that apply.    No   Not selected:    Metoclopramide-associated dystonic reaction    Tardive dyskinesia   Just a few more questions about medications, and then you're finished. Have you used any non-prescription medications or nasal sprays for your current symptoms? Examples include saline sprays, decongestants, NyQuil, and Tylenol. Select one.    Yes   Not selected:    No   Which of these non-prescription medications have you tried? Scroll to see all options. Select all that apply.    Diphenhydramine (Benadryl)    Fluticasone (Flonase)    Guaifenesin/dextromethorphan (Delsym DM, Mucinex DM, Robitussin DM)   Not selected:    Acetaminophen (Tylenol)    Budesonide (Rhinocort)    Cetirizine (Zyrtec)    Chlorpheniramine (Aller-chlor, Chlor-Trimeton)    Cromolyn (NasalCrom)    Dextromethorphan (Delsym, Robitussin, Vicks DayQuil Cough)    Fexofenadine (Allegra)    Guaifenesin (Mucinex)    Ibuprofen (Advil, Motrin, Midol)    Ketotifen (Alaway, Zaditor)    Loratadine (Alavert, Claritin)    Naphazoline-pheniramine (Naphcon-A, Opcon-A, Visine-A)    Omeprazole (Prilosec)    Oxymetazoline (Afrin)    Phenylephrine (Sudafed)    Triamcinolone (Nasacort)    None of the above   Have you taken any monoamine oxidase inhibitor (MAOI) medications in the  last 14 days? Examples include rasagiline (Azilect), selegiline (Eldepryl, Zelapar), isocarboxazid (Marplan), phenelzine (Nardil), and tranylcypromine (Parnate). Select one.    No, not that I know of   Not selected:    Yes   Do you take Kynmobi or Apokyn (apomorphine)? Select one.    No   Not selected:    Yes   Are you still taking these medications listed in your medical record? If you're not taking any of these, click Next. Select all that apply.    albuterol sulfate  (90 Base) MCG/ACT inhaler    NovoLOG FlexPen 100 UNIT/ML solution pen-injector sc pen    amphetamine-dextroamphetamine 15 MG tablet    Semglee (yfgn) 100 UNIT/ML solution pen-injector    omeprazole 20 MG capsule   Are you taking any other medications, vitamins, or supplements? Select one.    No   Not selected:    Yes   Have you ever had an allergic or bad reaction to any medication? Select one.    No   Not selected:    Yes   Are you allergic to milk or to the proteins found in milk (for example, whey or casein)? A milk allergy is different from lactose intolerance. Select one.    No, not that I know of   Not selected:    Yes   Have you ever had jaundice or liver problems as a result of taking amoxicillin-clavulanate (Augmentin)? Jaundice is a condition in which the skin and the whites of the eyes turn yellow. Select all that apply.    No, not that I know of   Not selected:    Yes, jaundice    Yes, liver problems   Have you ever had jaundice or liver problems as a result of taking azithromycin (Zithromax, Zmax)? Jaundice is a condition in which the skin and the whites of the eyes turn yellow. Select all that apply.    No, not that I know of   Not selected:    Yes, jaundice    Yes, liver problems   Do you need a doctor's note? A doctor's note confirms that you received care today and states when you can return to school or work. It does not contain information about your diagnosis or treatment plan. Your provider will make the final decision on  whether to give you a doctor's note and for how long. Doctor's notes CANNOT be backdated. We can't provide medical leave paperwork through this type of visit. If more paperwork is needed to request time off, contact your primary care provider. Select one.    3 days   Not selected:    Today only (1 day)    Today and tomorrow (2 days)    5 days    7 days    10 days    14 days    No   Is there anything else you'd like to tell us about your symptoms?   The patient did not enter any additional information.   ----------   Medical history   Medical history data does not currently exist for this patient.

## 2022-12-20 NOTE — EXTERNAL PATIENT INSTRUCTIONS
View Doctor's Note     Diagnosis   Viral upper respiratory infection (URI)   My name is Alma Villagomez, and I'm a healthcare provider at [facility]. I've reviewed your interview and based on your responses, I see that you have a viral upper respiratory infection. However, the exact type of virus causing your illness isn't clear.   The start of cold and flu season, coupled with the ongoing COVID-19 pandemic, makes it hard to tell the difference between the common cold, the flu, or a COVID-19 infection. These illnesses share many of the same symptoms, including fever, fatigue, muscle and/or body aches, sore throat, runny/stuffy nose, and cough.   Most people with any of these illnesses have mild to moderate symptoms and can rest at home until they get better.   I've given you a doctor's note for 3 days.   I haven't prescribed any antibiotics. Antibiotics fight bacteria, not viruses. They don't help when you have a viral infection like the common cold, the flu, or a COVID-19 infection. Antibiotics could even make you feel worse as they can cause or worsen nausea, diarrhea, and stomach pain.    Stay home   While you're recovering, STAY HOME. Do not leave your home except to get medical care.   While at home, avoid close contact with others.   If possible, stay in a room away from other people in your home, and use a separate bathroom.   Wear a face mask when you can't avoid contact with other people.   If you can't wear a face mask because of breathing difficulty, your caregiver should wear a face mask.   Wearing a face mask does NOT mean you can leave your home. You must continue to stay home until you have recovered.   You can return to your normal activities when ALL of the following are true:    You've been fever-free for at least 24 hours (1 full day) without using fever-reducing medications such as Tylenol    Your symptoms have improved    It's been at least 5 full days since your symptoms first started    "Prevention    Cover your mouth and nose with a tissue when you cough or sneeze. Throw used tissues in a lined trash can right away, and wash your hands immediately after.    Avoid sharing personal items like dishes, utensils, towels, or bedding. Wash items thoroughly with soap and water after use.    Wash your hands often with soap and water for at least 20 seconds. If soap and water are not available, clean your hands with a hand  that contains at least 60% alcohol. Cover all surfaces of your hands and rub them together until they feel dry.    Avoid touching your face, especially their eyes, nose, and mouth.    Clean \"high-touch\" surfaces daily. \"High-touch\" surfaces include counters, tabletops, doorknobs, bathroom fixtures, toilets, phones, keyboards, tablets, and bedside tables. You can use soap, detergents, 60%-80% ethanol or isopropyl alcohol,  such as Windex, or bleach. All of these  are effective at killing the virus that causes COVID-19.    Limit contact with pets and other animals while sick. If you must care for your pet, wash your hands before and after you interact with them and wear a face mask.   What to expect   Follow the advice in the treatment section below and you should feel better within 7 to 14 days. You may continue to feel tired and have a cough for several weeks.   Medications   Your pharmacy   [facility] [id number] [id number] [phone]     Prescription   Benzonatate (200mg): Take 1 capsule by mouth 3 times a day as needed for cough. Do not chew or cut these capsules.   Non-prescription   Guaifenesin/dextromethorphan (1200mg/60mg): Take 1 tablet by mouth twice a day as needed for cough. Brands to look for include Mucinex DM Maximum Strength.   The medications recommended here can help ease your symptoms while your immune system fights the virus.   About your diagnosis   The common cold, the flu (influenza), and COVID-19 are " respiratory illnesses that are caused by viruses. While the specific type of virus that causes these infections is different, the symptoms can often be similar. Fortunately, most people who get these infections have mild symptoms and can rest at home until they get better. For elderly people and those with chronic medical problems, these infections can be serious or life-threatening.   When to seek care   Call us at [phone]   with any sudden or unexpected symptoms.   Call your healthcare provider immediately if you have any of the following:    Fever over 103F    Fever that doesn't come down when you take Tylenol or ibuprofen    Fever that returns after being gone for more than 24 hours    Fever for more than 4 days    Worsening shortness of breath or difficulty breathing   Go to your nearest ER or call 911 if you have any of the following:    Shortness of breath that makes it difficult to do simple things like get dressed, bathe, or comb your hair    Persistent chest pain or chest tightness    New confusion or difficulty staying alert    Bluish color to the lips or face   Other treatment    Rest and drink plenty of sugar-free fluids.    Use a clean humidifier or a cool-mist vaporizer in your room at night. Breathing humid air may help with nasal congestion.    Gargle with salt water several times a day to help your throat feel better. Cough drops and throat lozenges may provide extra relief. A teaspoon of honey stirred into warm water or weak tea can help soothe a sore throat and cough.    Try using a Neti Pot to flush out your stuffy nose and sinuses. Neti Pots are available at any drugstore without a prescription.    Avoid smoke and air pollution. Smoke can make infections worse.    Coronavirus (COVID-19) information   Common symptoms of COVID-19 include fever, cough, shortness of breath, fatigue, muscle or body aches, headaches, new loss of sense of taste or smell, sore throat, stuffy or runny nose,  nausea or vomiting, and diarrhea. Most people who get COVID-19 have mild symptoms and can rest at home until they get better. Elderly people and those with chronic medical problems may be at risk for more serious complications.   FAQs about the COVID-19 vaccine   There are four authorized COVID-19 vaccines: Danny & Danny's Lidia Vaccine (J&J/Lidia), Moderna, Novavax, and Pfizer-My Single Point (Pfizer). The J&J/Lidia and Novavax vaccines are approved for use in people aged 18 and older. The Moderna and Pfizer vaccines are approved for those aged 6 months and older. All four are available at no cost. Even if you don't have health insurance, you can still get the COVID-19 vaccine for free.   Which vaccine is the best? Which vaccine should I get?   All four vaccines are highly effective. Even if you get COVID-19 after being vaccinated, all of the vaccines help prevent severe disease, hospitalization, and complications.   Most people should get whichever vaccine is first available to them. However, women younger than 50 years old should consider the rare risk of blood clots with low platelets after vaccination with the J&J/Lidia vaccine. This risk hasn't been seen with the other three vaccines.   Are the vaccines safe?   Yes. Hundreds of millions of people in the [place] have already safely received COVID-19 vaccines under the most intense safety monitoring in the history of the [place].   Do I need the vaccine if I've already had COVID?   Yes. Vaccination helps protect you even if you've already had COVID.   If you had COVID-19 and had symptoms, wait to get vaccinated until you've recovered and completed your isolation period.   If you tested positive for COVID-19 but did not have symptoms, you can get vaccinated after 5 full days have passed since you had a positive test, as long as you don't develop symptoms.   How many doses of the vaccine do I need?   Visit www.cdc.gov/coronavirus/2019-ncov/vaccines/stay-up-to-date.html    to find out how to stay up to date with your COVID-19 vaccines.   I'm immunocompromised. How many doses of the vaccine do I need?   For information on how immunocompromised people can stay up to date with their COVID-19 vaccines, visit www.cdc.gov/coronavirus/2019-ncov/vaccines/recommendations/immuno.html  .   What are the common side effects of the vaccine?   A sore arm, tiredness, headache, and muscle pain may occur within two days of getting the vaccine and last a day or two. For the Moderna or Pfizer vaccines, side effects are more common after the second dose. People over the age of 55 are less likely to have side effects than younger people.   After I'm up to date on vaccines, can I still get or spread COVID?   Yes, you can still get COVID, but your disease should be milder. And your risk of serious illness, hospitalization, and complications will be much lower, especially if you're up to date. Unfortunately, you can still spread COVID if you've been vaccinated. That's why it's important to follow isolation guidelines if you get sick or test positive.   After I'm up to date on vaccines, can I go back to normal?   You should still wear a mask indoors in public if:    It's required by laws, rules, regulations, or local guidance.    You have a weakened immune system.    Your age puts you at increased risk of severe disease.    You have a medical condition that puts you at increased risk of severe disease.    Someone in your household has a weakened immune system, is at increased risk for severe disease, or is unvaccinated.    You're in an area of high transmission.   Where can I get a COVID-19 vaccine?   Visit [facility]'s website for more information. To find a COVID-19 vaccination site near you, visit www.vaccines.gov/  , call [phone]  , or text your zip code to 003029 ([employer]). Message and data rates may apply.   What about travel?   Travel increases your risk of exposure to COVID-19. For more  information, see www.cdc.gov/coronavirus/2019-ncov/travelers/index.html  .   I've had close contact with someone who has COVID. Do I need to quarantine, and if so, for how long?   For the most current answer, including a calculator to determine whether you need to stay home and for how long, visit www.cdc.gov/coronavirus/2019-ncov/your-health/quarantine-isolation.html  .   I've tested positive for COVID. How long do I need to isolate?   For the latest recommendations, including a calculator to determine how long you need to stay home, visit www.cdc.gov/coronavirus/2019-ncov/your-health/quarantine-isolation.html  .   What if I develop symptoms that might be from COVID?   For the latest recommendations on what to do if you're sick, including when to seek emergency care, visit www.cdc.gov/coronavirus/2019-ncov/if-you-are-sick/steps-when-sick.html  .    Flu vaccine information   Who should get a flu vaccine?   Everyone 6 months of age and older should get a yearly flu vaccine.   When should I get vaccinated?   You should get a flu vaccine by the end of October. Once you're vaccinated, it takes about two weeks for antibodies to develop and protect you against the flu. That's why it's important to get vaccinated as soon as possible.   After October, is it too late to get vaccinated?   No. You should still get vaccinated. As long as the flu viruses are still in your community, flu vaccines will remain available, even into January of next year or later.   Why do I need a flu vaccine EVERY year?   Flu viruses are constantly changing, so flu vaccines are usually updated from one season to the next. Your protection from the flu vaccine also lessens over time.   Is the flu vaccine safe?   Yes. Over the last 50 years, hundreds of millions of Americans have safely received the flu vaccines.   What are the side effects of flu vaccines?   You CANNOT get the flu from a flu vaccine. Common side effects of the flu shot include  soreness, redness and/or swelling where the shot was given, low grade fever, and aches. Common side effects of the nasal spray flu vaccine for adults include runny nose, headaches, sore throat, and cough. For children, side effects include wheezing, vomiting, muscle aches, and fever.   Does the flu vaccine increase your risk of getting COVID-19?   No. There is no evidence that getting a flu vaccine increases your risk of getting COVID-19.   Is it safe to get the flu vaccine along with a COVID-19 vaccine?   Yes. It's safe to get the flu vaccine with a COVID-19 vaccine or booster.   Contact your healthcare provider TODAY for details on when and where to get your flu vaccine.   Your provider   Your diagnosis was provided by Alma Villagomez, a member of your trusted care team at [facility].   If you have any questions, call us at [phone]  .   View Doctor's Note     Expires on 01/19/23

## 2023-01-17 ENCOUNTER — TRANSCRIBE ORDERS (OUTPATIENT)
Dept: ADMINISTRATIVE | Facility: HOSPITAL | Age: 33
End: 2023-01-17
Payer: COMMERCIAL

## 2023-01-17 DIAGNOSIS — S53.124A CLOSED TRAUMATIC DISLOCATION OF POSTERIOR ELBOW JOINT, RIGHT, INITIAL ENCOUNTER: Primary | ICD-10-CM

## 2023-02-01 ENCOUNTER — TRANSCRIBE ORDERS (OUTPATIENT)
Dept: PHYSICAL THERAPY | Facility: HOSPITAL | Age: 33
End: 2023-02-01
Payer: COMMERCIAL

## 2023-02-01 ENCOUNTER — HOSPITAL ENCOUNTER (OUTPATIENT)
Dept: PHYSICAL THERAPY | Facility: HOSPITAL | Age: 33
Setting detail: THERAPIES SERIES
Discharge: HOME | End: 2023-02-01
Payer: COMMERCIAL

## 2023-02-01 DIAGNOSIS — S51.001A: Primary | ICD-10-CM

## 2023-02-01 DIAGNOSIS — M25.521 ELBOW PAIN, RIGHT: ICD-10-CM

## 2023-02-01 DIAGNOSIS — S53.124A: Primary | ICD-10-CM

## 2023-02-01 DIAGNOSIS — S53.104A DISLOCATION OF RIGHT ELBOW, INITIAL ENCOUNTER: ICD-10-CM

## 2023-02-01 DIAGNOSIS — M25.621 DECREASED ROM OF RIGHT ELBOW: Primary | ICD-10-CM

## 2023-02-01 PROCEDURE — 97161 PT EVAL LOW COMPLEX 20 MIN: CPT

## 2023-02-01 PROCEDURE — 97110 THERAPEUTIC EXERCISES: CPT

## 2023-02-01 NOTE — THERAPY EVALUATION
Outpatient Physical Therapy Ortho Initial Evaluation  [facility]     Patient Name: Shirin Mercer  : 1990  MRN: [id number]  Today's Date: 2023      Visit Date: 2023    Patient Active Problem List   Diagnosis   • Type 1 diabetes mellitus with hyperglycemia (HCC)   • Arthralgia of elbow   • Diabetes 1.5, managed as type 1 (HCC)        Past Medical History:   Diagnosis Date   • Allergies    • GERD (gastroesophageal reflux disease)    • Type 1 diabetes mellitus (HCC)         Past Surgical History:   Procedure Laterality Date   • WISDOM TOOTH EXTRACTION         Visit Dx:     ICD-10-CM ICD-9-CM   1. Decreased ROM of right elbow  M25.621 719.52   2. Elbow pain, right  M25.521 719.42   3. Dislocation of right elbow, initial encounter  S53.104A 832.00          Patient History     Row Name 23 1200             History    Chief Complaint Pain;Difficulty with daily activities  -DB      Type of Pain Elbow pain  -DB      Date Current Problem(s) Began 23  -DB      Brief Description of Current Complaint Shirin Mercer is a 32 y.o. female who presents today with R elbow pain. Pt states she was at a friends house and she slipped on hardwood floor when she was wearing socks. Pt states this occurred on 23, a little over 2 weeks ago. Pt states she was able to reduce the dislocation the night off. Pt states she went to the urgent care the next day, had Xrays performed and then made an appt with Dr. Sanches and had MRI performed. Imaging (-).  Shirin Mercer reports difficulty/increased pain with lifting, doing ADL's such as washing her hair or pulling her hair up. Pt wearing an arm brace with restrictions of  degrees, locks at 90 at night for comfort. Pain relieving factors include rest, ice, medication. Denies N/T. PMH includes Type 1 DM.  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 Yes  occasional  -DB      Is medication used to assist with sleep? Yes  ibuprofen  -DB      What position do you sleep in? Left sidelying;Supine  -DB         Fall Risk Assessment    Any falls in the past year: Yes  -DB      Number of falls reported in the last 12 months 1  -DB      Factors that contributed to the fall: Slippery surface  -DB            User Key  (r) = Recorded By, (t) = Taken By, (c) = Cosigned By    Initials Name Provider Type    Ewelina Kwan PT Physical Therapist                 PT Ortho     Row Name 02/01/23 1200       Posture/Observations    Alignment Options Rounded shoulders  -DB    Rounded Shoulders Moderate  -DB    Posture/Observations Comments guarded throughout PROM assessment  -DB       Elbow/Forearm Special Tests    Tinel's Sign (Ulnar Nerve Irritation) Negative  -DB    Active Pronation Negative  -DB       Right Upper Ext    Rt Elbow Extension/Flexion AROM  degrees  -DB    Rt Elbow Extension/Flexion PROM  degrees  -DB    Rt Elbow Supination AROM 45 degrees  -DB    Rt Elbow Pronation AROM 70 degrees  -DB       MMT (Manual Muscle Testing)    General MMT Comments not foramally assessed d/t inc'd pain  -DB       MMT Right Upper Ext    Rt Elbow Flexion MMT, Gross Movement: (3+/5) fair plus  -DB    Rt Elbow Extension MMT, Gross Movement: (3+/5) fair plus  -DB    Rt Forearm Supination MMT, Gross Movement (3+/5) fair plus  -DB    Rt Forearm Pronation MMT, Gross Movement (3+/5) fair plus  -DB       Sensation    Sensation WNL? WNL  -DB    Light Touch No apparent deficits  -DB    Sharp/Dull No apparent deficits  -DB          User Key  (r) = Recorded By, (t) = Taken By, (c) = Cosigned By    Initials Name Provider Type    Ewelina Kwan PT Physical Therapist                            Therapy Education  Education Details: Access Code  [email or web address]  Given: HEP, Symptoms/condition management, Pain management, Posture/body mechanics  Program: New  How Provided: Verbal, Demonstration, Written  Provided to: Patient  Level of Understanding: Teach back education performed, Verbalized, Demonstrated      PT OP Goals     Row Name 02/01/23 1200          PT Short Term Goals    STG Date to Achieve 03/03/23  -DB     STG 1 Pt will be independent and verbalized good understanding of initial HEP to improve ability to manage pain, as well as improve strength and ROM.  -DB     STG 1 Progress New  -DB     STG 2 Pt will improve R elbow AROM at least  degrees to improve ability to perform ADL's.  -DB     STG 2 Progress New  -DB        Long Term Goals    LTG Date to Achieve 04/02/23  -DB     LTG 1 Pt will be independent and verbalized good understanding of advanced HEP to improve ability to manage pain, as well as improve strength and ROM.  -DB     LTG 1 Progress New  -DB     LTG 2 Pt will report 1/10 pain in R elbow with overhead activities to improve participation in ADLs.  -DB     LTG 2 Progress New  -DB     LTG 3 Pt will demonstrate strength in R elbow of >/=4+/5 in order to participate in ADL's.  -DB     LTG 3 Progress New  -DB     LTG 4 Pt will able to walk her dog without any inc'd pain in R elbow to return to leisure activity and improve quality of life.  -DB     LTG 4 Progress New  -DB     LTG 5 Pt will improve DASH score to at least 25% perceived disability to show overall improved quality of life.  -DB     LTG 5 Progress New  -DB        Time Calculation    PT Goal Re-Cert Due Date 05/02/23  -DB           User Key  (r) = Recorded By, (t) = Taken By, (c) = Cosigned By    Initials Name Provider Type    Ewelina Kwan, PT Physical Therapist                 PT Assessment/Plan     Row Name 02/01/23 1200          PT Assessment    Functional Limitations Limitation in home management;Limitations in functional capacity and performance;Performance in leisure  activities;Performance in self-care ADL;Performance in sport activities;Performance in work activities  -DB     Impairments Pain;Posture;Range of motion;Muscle strength  -DB     Assessment Comments Shirin Mercer is a 32 y.o. female referred to physical therapy for R elbow dislocation sustained after FOOSH. She presents with a stable clinical presentation, along with comorbidities of DM1 and no remarkable personal factors that may impact her progress in the plan of care. Pt presents today in an elbow brace that is locked at  degrees. Her impairments include AROM, PROM, strength, posture, and inc'd pain. Her signs and symptoms are consistent with referring diagnosis. The previous impairments limit her ability to perform ADL's such as getting dressed, grooming tasks, and perform leisure activities such as walking her dog. Pt will benefit from skilled PT to address the previous impairments and return to PLOF.  -DB     Please refer to paper survey for additional self-reported information Yes  -DB     Rehab Potential Good  -DB     Patient/caregiver participated in establishment of treatment plan and goals Yes  -DB     Patient would benefit from skilled therapy intervention Yes  -DB        PT Plan    PT Frequency 2x/week  -DB     Predicted Duration of Therapy Intervention (PT) 12 visits  -DB     Planned CPT's? PT EVAL LOW COMPLEXITY: 65019;PT RE-EVAL: 50421;PT THER PROC EA 15 MIN: 59108;PT THER ACT EA 15 MIN: 34098;PT MANUAL THERAPY EA 15 MIN: 11211;PT NEUROMUSC RE-EDUCATION EA 15 MIN: 48480;PT SELF CARE/HOME MGMT/TRAIN EA 15: 87207;PT HOT OR COLD PACK TREAT MCARE;PT ELECTRICAL STIM UNATTEND:   -DB     PT Plan Comments Next session: assess  strength, set a goal? PROM/AAROM of elbow. consider pulleys, self stretching, bar walkout. Progress towards AROM and strengthening  -DB           User Key  (r) = Recorded By, (t) = Taken By, (c) = Cosigned By    Initials Name Provider Type    DB Ewelina Saavedra, PT  Physical Therapist                   OP Exercises     Row Name 02/01/23 1200             Total Minutes    02403 - PT Therapeutic Exercise Minutes 10  -DB         Exercise 1    Exercise Name 1 elbow flex/ext AROM  -DB      Cueing 1 Verbal;Demo  -DB      Sets 1 1  -DB      Reps 1 10  -DB         Exercise 2    Exercise Name 2 elbow sup/pronation  -DB      Cueing 2 Verbal;Demo  -DB      Sets 2 1  -DB      Reps 2 10  -DB         Exercise 3    Exercise Name 3 wrist flex/ext AROM  -DB      Cueing 3 Verbal;Demo  -DB      Sets 3 1  -DB      Reps 3 10  -DB         Exercise 4    Exercise Name 4 thumb opposition  -DB      Cueing 4 Verbal;Demo  -DB      Reps 4 10  -DB         Exercise 5    Exercise Name 5 towel gripping  -DB      Cueing 5 Verbal;Demo  -DB      Reps 5 10  -DB      Time 5 5  -DB            User Key  (r) = Recorded By, (t) = Taken By, (c) = Cosigned By    Initials Name Provider Type    Ewelina Kwan, PT Physical Therapist                              Outcome Measure Options: Quick DASH  Quick DASH  Open a tight or new jar.: Severe Difficulty  Do heavy household chores (e.g., wash walls, wash floors): Unable  Carry a shopping bag or briefcase: Unable  Wash your back: Unable  Use a knife to cut food: Unable  Recreational activities in which you take some force or impact through your arm, should or hand (e.g. golf, hammering, tennis, etc.): Unable  During the past week, to what extent has your arm, shoulder, or hand problem interfered with your normal social activites with family, friends, neighbors or groups?: Moderately  During the past week, were you limited in your work or other regular daily activities as a result of your arm, shoulder or hand problem?: Very limited  Arm, Shoulder, or hand pain: Moderate  Tingling (pins and needles) in your arm, shoulder, or hand: None  During the past week, how much difficulty have you had sleeping because of the pain in your arm, shoulder or hand?: Moderate  Difficiculty  Number of Questions Answered: 11  Quick DASH Score: 72.73         Time Calculation:     Start Time: 1155  Stop Time: 1225  Time Calculation (min): 30 min  Total Timed Code Minutes- PT: 10 minute(s)  Timed Charges  26355 - PT Therapeutic Exercise Minutes: 10  Total Minutes  Timed Charges Total Minutes: 10   Total Minutes: 10     Therapy Charges for Today     Code Description Service Date Service Provider Modifiers Qty    01112312349 HC PT THER PROC EA 15 MIN 2/1/2023 Ewelina Saavedra, PT GP 1    [id number]  PT EVAL LOW COMPLEXITY 1 2/1/2023 Ewelina Saavedra, PT GP 1          PT G-Codes  Outcome Measure Options: Quick DASH  Quick DASH Score: 72.73         Ewelina Saavedra, PT  2/1/2023

## 2023-02-08 ENCOUNTER — HOSPITAL ENCOUNTER (OUTPATIENT)
Dept: PHYSICAL THERAPY | Facility: HOSPITAL | Age: 33
Setting detail: THERAPIES SERIES
Discharge: HOME | End: 2023-02-08
Payer: COMMERCIAL

## 2023-02-08 DIAGNOSIS — M25.621 DECREASED ROM OF RIGHT ELBOW: ICD-10-CM

## 2023-02-08 DIAGNOSIS — M25.521 ELBOW PAIN, RIGHT: Primary | ICD-10-CM

## 2023-02-08 DIAGNOSIS — S53.104A DISLOCATION OF RIGHT ELBOW, INITIAL ENCOUNTER: ICD-10-CM

## 2023-02-08 PROCEDURE — 97110 THERAPEUTIC EXERCISES: CPT

## 2023-02-08 PROCEDURE — 97140 MANUAL THERAPY 1/> REGIONS: CPT

## 2023-02-08 NOTE — THERAPY TREATMENT NOTE
Outpatient Physical Therapy Ortho Treatment Note  [facility]     Patient Name: Shirin Mercer  : 1990  MRN: [id number]  Today's Date: 2023      Visit Date: 2023    Visit Dx:    ICD-10-CM ICD-9-CM   1. Elbow pain, right  M25.521 719.42   2. Decreased ROM of right elbow  M25.621 719.52   3. 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HCC)        Past Medical History:   Diagnosis Date   • Allergies    • GERD (gastroesophageal reflux disease)    • Type 1 diabetes mellitus (HCC)         Past Surgical History:   Procedure Laterality Date   • WISDOM TOOTH EXTRACTION          PT Ortho     Row Name 23 0700        Strength Right    # Reps 3  -SILVERIO    Right Rung 2  -SILVERIO    Right  Test 1 32  -SILVERIO    Right  Test 2 32  -SILVERIO    Right  Test 3 30  -SILVERIO     Strength Average Right 31.33  -SILVERIO        Strength Left    # Reps 3  -SILVERIO    Left Rung 2  -SILVERIO    Left  Test 1 56  -SILVERIO    Left  Test 2 48  -SILVERIO    Left  Test 3 52  -SILVERIO     Strength Average Left 52  -SILVERIO       Hand  Strength     Strength Affected Side Bilateral  -SILVERIO          User Key  (r) = Recorded By, (t) = Taken By, (c) = Cosigned By    Initials Name Provider Type    Chanda Frances, PT Physical Therapist                             PT Assessment/Plan     Row Name 23 0700          PT Assessment    Assessment Comments Ms. Mercer returns to PT for first f/u after initial evaluation and is currently 3 weeks and 5 days from R elbow dislocation. 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 She now feels she has some improvement in her mobility as she is able to wash and put up her hair with greater ease.  assessed and patient presents with 21#  difference in  strength. Added LTG to address limitation. Reviewed initial exercises with addition of pulleys, wall slides, counter walk outs to address ROM restrictions and scapular retraction to promote improve posture. Updated HEP accordingly and reviewed changes with patient. Ms. Mercer remains an excellent candidate for skilled PT.  -SILVERIO        PT Plan    PT Plan Comments inc time on manual therapy, consider supine triceps ext, patient under 5# weight restriction  -SILVERIO           User Key  (r) = Recorded By, (t) = Taken By, (c) = Cosigned By    Initials Name Provider Type    Chanda Frances, PT Physical Therapist                   OP Exercises     Row Name 02/08/23 0700             Subjective Comments    Subjective Comments I saw my surgeon in the hi while at work and he has me weaning out of the brace while at home  -SILVERIO         Subjective Pain    Able to rate subjective pain? yes  -SILVEROI      Pre-Treatment Pain Level 1  -SILVERIO         Total Minutes    05714 - PT Therapeutic Exercise Minutes 33  -SILVERIO      59242 - PT Manual Therapy Minutes 12  -SILVERIO         Exercise 1    Exercise Name 1 pulleys  -SILVERIO      Time 1 4 mins  -SILVERIO      Additional Comments elbow flexion  -SILVERIO         Exercise 2    Exercise Name 2 elbow sup/pronation  -SILVERIO      Cueing 2 Verbal;Demo  -SILVERIO      Sets 2 2  -SILVERIO      Reps 2 10  -SILVERIO      Time 2 5s  -SILVERIO      Additional Comments mallet  -SILVERIO         Exercise 3    Exercise Name 3 wrist flex/ext AROM  -SILVERIO      Cueing 3 Verbal;Demo  -SILVERIO      Sets 3 2  -SILVERIO      Reps 3 10  -SILVERIO      Time 3 1#  -SILVERIO         Exercise 4    Exercise Name 4 elbow flex/ext AROM  -SILVERIO      Cueing 4 Verbal;Demo  -SILVERIO      Sets 4 2  -SILVERIO      Reps 4 10  -SILVERIO      Time 4 10s  -SILVERIO         Exercise 5    Exercise Name 5 white gripper  -SILVERIO      Cueing 5 Verbal;Demo  -SILVERIO      Sets 5 1  -SILVERIO      Reps 5 20  -SILVERIO      Time 5 5s  -SILVERIO         Exercise 6    Exercise Name 6 wall slides for elbow flex/ext  -SILVERIO      Cueing 6 Verbal;Demo  -SILVERIO       Sets 6 1  -SILVERIO      Reps 6 10  -SILVERIO      Time 6 5s  -SILVERIO         Exercise 7    Exercise Name 7 counter walk outs  -SILVERIO      Cueing 7 Verbal;Demo  -SILVERIO      Sets 7 1  -SILVERIO      Reps 7 10  -SILVERIO      Time 7 10s  -SILVERIO         Exercise 8    Exercise Name 8 scapular retraction  -SILVERIO      Cueing 8 Verbal;Demo  -SILVERIO      Sets 8 2  -SILVERIO      Reps 8 10  -SILVERIO      Time 8 3s  -SILVERIO            User Key  (r) = Recorded By, (t) = Taken By, (c) = Cosigned By    Initials Name Provider Type    Chanda Frances, PT Physical Therapist                         Manual Rx (last 36 hours)     Manual Treatments     Row Name 02/08/23 0700             Total Minutes    56934 - PT Manual Therapy Minutes 12  -SILVERIO         Manual Rx 1    Manual Rx 1 Location R elbow  -SILVERIO      Manual Rx 1 Type gentle flex/ext proximal joint mobs  -SILVERIO         Manual Rx 2    Manual Rx 2 Location R elbow  -SILVERIO      Manual Rx 2 Type PROM flexion/ext  -SILVERIO         Manual Rx 3    Manual Rx 3 Location STM R biceps/triceps/pronator/supinator  -SILVERIO            User Key  (r) = Recorded By, (t) = Taken By, (c) = Cosigned By    Initials Name Provider Type    Chanda Frances, PT Physical Therapist                 PT OP Goals     Row Name 02/08/23 0700          PT Short Term Goals    STG Date to Achieve 03/03/23  -     STG 1 Pt will be independent and verbalized good understanding of initial HEP to improve ability to manage pain, as well as improve strength and ROM.  -     STG 1 Progress Ongoing  -     STG 2 Pt will improve R elbow AROM at least  degrees to improve ability to perform ADL's.  -     STG 2 Progress Ongoing  -        Long Term Goals    LTG Date to Achieve 04/02/23  -     LTG 1 Pt will be independent and verbalized good understanding of advanced HEP to improve ability to manage pain, as well as improve strength and ROM.  -     LTG 1 Progress Ongoing  -     LTG 2 Pt will report 1/10 pain in R elbow with overhead activities to improve  participation in ADLs.  -     LTG 2 Progress Ongoing  -SILVERIO     LTG 3 Pt will demonstrate strength in R elbow of >/=4+/5 in order to participate in ADL's.  -SILVERIO     LTG 3 Progress Ongoing  -SILVERIO     LTG 4 Pt will able to walk her dog without any inc'd pain in R elbow to return to leisure activity and improve quality of life.  -SILVERIO     LTG 4 Progress Ongoing  -SILVERIO     LTG 5 Pt will improve DASH score to at least 25% perceived disability to show overall improved quality of life.  -SILVERIO     LTG 5 Progress Ongoing  -SILVERIO     LTG 6 Pt will demonstrate R  strength to 40-45# in order to increase ability to perform fine motor tasks including gripping and writing while participating in work related duties.  -     LTG 6 Progress New  -           User Key  (r) = Recorded By, (t) = Taken By, (c) = Cosigned By    Initials Name Provider Type    Chanda Frances, PT Physical Therapist                Therapy Education  Education Details: updated HEP, general expectations with POC  Given: HEP, Symptoms/condition management, Pain management, Posture/body mechanics  Program: Reinforced, Progressed  How Provided: Verbal, Demonstration, Written  Provided to: Patient  Level of Understanding: Verbalized, Demonstrated              Time Calculation:   Start Time: 0700  Stop Time: 0745  Time Calculation (min): 45 min  Timed Charges  79799 - PT Therapeutic Exercise Minutes: 33  28105 - PT Manual Therapy Minutes: 12  Total Minutes  Timed Charges Total Minutes: 45   Total Minutes: 45  Therapy Charges for Today     Code Description Service Date Service Provider Modifiers Qty    [id number]  PT THER PROC EA 15 MIN 2/8/2023 Chanda English, PT GP 2    [id number]  PT MANUAL THERAPY EA 15 MIN 2/8/2023 Chanda English, SAHIL GP 1                    Chanda Enlgish PT  2/8/2023

## 2023-02-10 ENCOUNTER — HOSPITAL ENCOUNTER (OUTPATIENT)
Dept: PHYSICAL THERAPY | Facility: HOSPITAL | Age: 33
Setting detail: THERAPIES SERIES
Discharge: HOME | End: 2023-02-10
Payer: COMMERCIAL

## 2023-02-10 DIAGNOSIS — M25.621 DECREASED ROM OF RIGHT ELBOW: ICD-10-CM

## 2023-02-10 DIAGNOSIS — S53.104A DISLOCATION OF RIGHT ELBOW, INITIAL ENCOUNTER: ICD-10-CM

## 2023-02-10 DIAGNOSIS — M25.521 ELBOW PAIN, RIGHT: Primary | ICD-10-CM

## 2023-02-10 PROCEDURE — 97110 THERAPEUTIC EXERCISES: CPT | Performed by: PHYSICAL THERAPIST

## 2023-02-10 PROCEDURE — 97140 MANUAL THERAPY 1/> REGIONS: CPT | Performed by: PHYSICAL THERAPIST

## 2023-02-10 NOTE — THERAPY TREATMENT NOTE
Outpatient Physical Therapy Ortho Treatment Note  [facility]     Patient Name: Shirin Mercer  : 1990  MRN: [id number]  Today's Date: 2/10/2023      Visit Date: 02/10/2023    Visit Dx:    ICD-10-CM ICD-9-CM   1. Elbow pain, right  M25.521 719.42   2. Decreased ROM of right elbow  M25.621 719.52   3. 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HCC)        Past Medical History:   Diagnosis Date   • Allergies    • GERD (gastroesophageal reflux disease)    • Type 1 diabetes mellitus (HCC)         Past Surgical History:   Procedure Laterality Date   • WISDOM TOOTH EXTRACTION          PT Ortho     Row Name 02/10/23 1300       Right Upper Ext    Rt Elbow Extension/Flexion AROM   -GJ    Rt Elbow Extension/Flexion PROM 18  -GJ          User Key  (r) = Recorded By, (t) = Taken By, (c) = Cosigned By    Initials Name Provider Type    Kota Glez, PT Physical Therapist                             PT Assessment/Plan     Row Name 02/10/23 5999          PT Assessment    Assessment Comments Ms. Alvarez returns to the clinic reporting weaning out of her brace.  She reports increased soreness by the end of the day. Tomorrow, she will be 4 weeks out from R elbow posterior dislocation.  We continued to owrk on R elbow/shoulder AROM/gentle strenghtening, progressing resistance and adding several activities today. She demonstrates improved PROM R elbow (see ortho). She demonstrates significant weakness throuhout RUE, noted with shaking with all activities.  Performed STM to R biceps/wrist flexor/extensor tissues.  Ms. Alvarez is progressing toward all functional goals.  -GJ        PT Plan    PT Plan Comments assess response to previous session and trip to [place]. reasses ROM of R elbow and shoulder, warm up on UBE,  pulleys, add shoulder flexion in supine with cane, chest press, ? prone shoulder ext to neutral, ? shoulder ext in standign with YTB, gripping, PROM R shoulder/elbow  -GJ           User Key  (r) = Recorded By, (t) = Taken By, (c) = Cosigned By    Initials Name Provider Type     Kota Andrews, PT Physical Therapist                   OP Exercises     Row Name 02/10/23 1311 02/10/23 1300          Subjective Comments    Subjective Comments -- I tried going without the brace yesterday at work and was pretty sore.  No feeling of instability  -GJ        Total Minutes    19575 - PT Therapeutic Exercise Minutes 28  -GJ --     36286 - PT Manual Therapy Minutes 19  -GJ --        Exercise 1    Exercise Name 1 -- pulleys  -GJ     Cueing 1 -- Verbal;Demo  -GJ     Time 1 -- 3 min  -GJ        Exercise 2    Exercise Name 2 -- elbow sup/pronation  -GJ     Cueing 2 -- Verbal;Demo  -GJ     Sets 2 -- 2  -GJ     Reps 2 -- 10  -GJ     Time 2 -- 5s  -GJ     Additional Comments -- mallet  -GJ        Exercise 3    Exercise Name 3 -- wrist flex/ext AROM  -GJ     Cueing 3 -- Verbal;Demo  -GJ     Sets 3 -- 2, each  -GJ     Reps 3 -- 10  -GJ     Time 3 -- 1#  -GJ     Additional Comments -- forearm supported on chair rest  -GJ        Exercise 4    Exercise Name 4 -- elbow flex/ext AROM  -GJ     Cueing 4 -- Verbal;Demo  -GJ     Sets 4 -- 2  -GJ     Reps 4 -- 10  -GJ     Time 4 -- 1#  -GJ        Exercise 5    Exercise Name 5 -- white gripper  -GJ     Cueing 5 -- Verbal;Demo  -GJ     Reps 5 -- 20  -GJ     Time 5 -- 5s  -GJ     Additional Comments -- medium (2 springs)  -GJ        Exercise 6    Exercise Name 6 -- wall slides for elbow flex/ext  -GJ     Cueing 6 -- Verbal;Demo  -GJ     Sets 6 -- 1  -GJ     Reps 6 -- 10  -GJ     Time 6 -- 5s  -GJ     Additional Comments -- RUE only, lift off at end  -GJ        Exercise 8    Exercise Name 8 -- scapular retraction  -GJ     Cueing 8 -- Verbal;Demo  -GJ     Sets 8 -- 2  -GJ     Reps 8 -- 10  -GJ      Time 8 -- RTB  -GJ        Exercise 9    Exercise Name 9 -- UBE  -GJ     Cueing 9 -- Verbal;Demo  -GJ     Time 9 -- 4 min  -GJ        Exercise 10    Exercise Name 10 -- supine triceps  -GJ     Cueing 10 -- Verbal;Tactile;Demo  -GJ     Sets 10 -- 2  -GJ     Reps 10 -- 10  -GJ     Time 10 -- 0#  -GJ        Exercise 11    Exercise Name 11 -- supine AAROM shoulder flexion with dowel  -GJ     Additional Comments -- introduced today, will formaly give next visit  -GJ        Exercise 12    Exercise Name 12 -- supine chest press  -GJ     Additional Comments -- introduced today, will formaly give next visit  -GJ           User Key  (r) = Recorded By, (t) = Taken By, (c) = Cosigned By    Initials Name Provider Type    Kota Glez, PT Physical Therapist                         Manual Rx (last 36 hours)     Manual Treatments     Row Name 02/10/23 1311             Total Minutes    61646 - PT Manual Therapy Minutes 19  -GJ         Manual Rx 2    Manual Rx 2 Location R elbow  -GJ      Manual Rx 2 Type PROM flexion/ext  -GJ         Manual Rx 3    Manual Rx 3 Location STM R biceps/triceps/pronator/supinator  -GJ            User Key  (r) = Recorded By, (t) = Taken By, (c) = Cosigned By    Initials Name Provider Type    Kota Glez, PT Physical Therapist                 PT OP Goals     Row Name 02/10/23 1300          PT Short Term Goals    STG Date to Achieve 03/03/23  -GJ     STG 1 Pt will be independent and verbalized good understanding of initial HEP to improve ability to manage pain, as well as improve strength and ROM.  -GJ     STG 1 Progress Met  -GJ     STG 2 Pt will improve R elbow AROM at least  degrees to improve ability to perform ADL's.  -GJ     STG 2 Progress Ongoing  -GJ     STG 2 Progress Comments see ortho  -GJ        Long Term Goals    LTG Date to Achieve 04/02/23  -GJ     LTG 1 Pt will be independent and verbalized good understanding of advanced HEP to improve ability to manage pain, as well as  improve strength and ROM.  -GJ     LTG 1 Progress Ongoing  -GJ     LTG 2 Pt will report 1/10 pain in R elbow with overhead activities to improve participation in ADLs.  -GJ     LTG 2 Progress Ongoing  -GJ     LTG 3 Pt will demonstrate strength in R elbow of >/=4+/5 in order to participate in ADL's.  -GJ     LTG 3 Progress Ongoing  -GJ     LTG 4 Pt will able to walk her dog without any inc'd pain in R elbow to return to leisure activity and improve quality of life.  -GJ     LTG 4 Progress Ongoing  -GJ     LTG 5 Pt will improve DASH score to at least 25% perceived disability to show overall improved quality of life.  -GJ     LTG 5 Progress Ongoing  -GJ     LTG 6 Pt will demonstrate R  strength to 40-45# in order to increase ability to perform fine motor tasks including gripping and writing while participating in work related duties.  -GJ     LTG 6 Progress Ongoing  -GJ           User Key  (r) = Recorded By, (t) = Taken By, (c) = Cosigned By    Initials Name Provider Type    Kota Glez, PT Physical Therapist                Therapy Education  Education Details: discussed activities she can perform in pool (traveling to [facility] next week). No new exercises for home.  Given: HEP, Symptoms/condition management, Pain management, Posture/body mechanics, Mobility training  Program: Reinforced, New  How Provided: Verbal, Demonstration  Provided to: Patient  Level of Understanding: Teach back education performed, Verbalized, Demonstrated              Time Calculation:   Start Time: 1315  Stop Time: 1402  Time Calculation (min): 47 min  Timed Charges  45468 - PT Therapeutic Exercise Minutes: 28  40466 - PT Manual Therapy Minutes: 19  Total Minutes  Timed Charges Total Minutes: 47   Total Minutes: 47  Therapy Charges for Today     Code Description Service Date Service Provider Modifiers Qty    02568941829  PT THER PROC EA 15 MIN 2/10/2023 Kota Andrews, PT GP 2    [id number]  PT MANUAL THERAPY EA 15 MIN  2/10/2023 Kota Andrews, PT GP 1                    Kota Andrews, PT  2/10/2023

## 2023-02-20 ENCOUNTER — HOSPITAL ENCOUNTER (OUTPATIENT)
Dept: PHYSICAL THERAPY | Facility: HOSPITAL | Age: 33
Setting detail: THERAPIES SERIES
Discharge: HOME | End: 2023-02-20
Payer: COMMERCIAL

## 2023-02-20 DIAGNOSIS — S53.104A DISLOCATION OF RIGHT ELBOW, INITIAL ENCOUNTER: ICD-10-CM

## 2023-02-20 DIAGNOSIS — M25.521 ELBOW PAIN, RIGHT: Primary | ICD-10-CM

## 2023-02-20 DIAGNOSIS — M25.621 DECREASED ROM OF RIGHT ELBOW: ICD-10-CM

## 2023-02-20 PROCEDURE — 97110 THERAPEUTIC EXERCISES: CPT | Performed by: PHYSICAL THERAPIST

## 2023-02-20 PROCEDURE — 97530 THERAPEUTIC ACTIVITIES: CPT | Performed by: PHYSICAL THERAPIST

## 2023-02-20 PROCEDURE — 97140 MANUAL THERAPY 1/> REGIONS: CPT | Performed by: PHYSICAL THERAPIST

## 2023-02-20 NOTE — THERAPY TREATMENT NOTE
Outpatient Physical Therapy Ortho Treatment Note  [facility]     Patient Name: Shirin Mercer  : 1990  MRN: [id number]  Today's Date: 2023      Visit Date: 2023    Visit Dx:    ICD-10-CM ICD-9-CM   1. Elbow pain, right  M25.521 719.42   2. Decreased ROM of right elbow  M25.621 719.52   3. 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HCC)        Past Medical History:   Diagnosis Date   • Allergies    • GERD (gastroesophageal reflux disease)    • Type 1 diabetes mellitus (HCC)         Past Surgical History:   Procedure Laterality Date   • WISDOM TOOTH EXTRACTION          PT Ortho     Row Name 23 0700       General ROM    RT Upper Ext Rt Shoulder ABduction;Rt Shoulder Flexion;Rt Shoulder External Rotation;Rt Shoulder Internal Rotation  -GJ    LT Upper Ext Lt Shoulder ABduction;Lt Shoulder Flexion;Lt Shoulder External Rotation;Lt Shoulder Internal Rotation  -GJ       Right Upper Ext    Rt Shoulder Abduction AROM 140 seated  -GJ    Rt Shoulder Abduction PROM 170, AAROM in standiing with cane  -GJ    Rt Shoulder Flexion AROM 150 seated  -GJ    Rt Shoulder Internal Rotation AROM FIR midliine T10  -GJ    Rt Elbow Extension/Flexion PROM   -GJ       Left Upper Ext    Lt Shoulder Abduction AROM 160 seated  -GJ    Lt Shoulder Flexion AROM 150 eated  -GJ    Lt Shoulder Internal Rotation AROM FIR midline T4/5  -GJ        Strength Right    # Reps 3  -GJ    Right Rung 2  -GJ    Right  Test 1 40  -GJ    Right  Test 2 40  -GJ    Right  Test 3 40  -GJ     Strength Average Right 40  -GJ          User Key  (r) = Recorded By, (t) = Taken By, (c) = Cosigned By    Initials Name Provider Type    Kota Glez, PT Physical Therapist                             PT Assessment/Plan     Row Name 23  0755          PT Assessment    Assessment Comments Ms. Mercer returns following her vacation reporting increased soreness in her elbow, like a she had a big work out. Denies any feelings of instability.  She is s/p R elbow dislocatioin, 5 weeks, 3 days. See ortho section for updated objective data. She demosntrates improving  strength and ROM.  Assesed her R shoulder, and she demonstrates some limitations in AROM abudction, but demonstrates near full PROM.  SHe demonstrates improving strength of her RUE, noted with significantly less shaking.  Updated HEP. Ms. Mercer continues to be a good candidate for skilled physical therapy.  -GJ        PT Plan    PT Plan Comments continue to work on shoulder ROM, elbow ROM, strength of entire RUE/scapular girdle (may consider prone unilateral Y, T, I).  work on elbow extension ROM.  -GJ           User Key  (r) = Recorded By, (t) = Taken By, (c) = Cosigned By    Initials Name Provider Type     Kota Andrews, PT Physical Therapist                   OP Exercises     Row Name 02/20/23 0749 02/20/23 0700          Subjective Comments    Subjective Comments -- I did well with the trip, but my elbow/arm is really sore  -GJ        Total Minutes    99548 - PT Therapeutic Exercise Minutes 17  -GJ --     38131 - PT Therapeutic Activity Minutes 15  assessment  -GJ --     26285 - PT Manual Therapy Minutes 9  -GJ --        Exercise 1    Exercise Name 1 -- pulleys  -GJ     Time 1 -- 3 min  -GJ        Exercise 4    Exercise Name 4 -- elbow flex/ext AROM  -GJ     Cueing 4 -- Verbal;Demo  -GJ     Sets 4 -- 2  -GJ     Reps 4 -- 10  -GJ     Time 4 -- 2#  -GJ        Exercise 6    Exercise Name 6 -- wall slides for elbow flex/ext  -GJ     Cueing 6 -- Verbal;Demo  -GJ     Sets 6 -- 1  -GJ     Reps 6 -- 10  -GJ     Time 6 -- 5s  -GJ     Additional Comments -- RUE only, lift off at end  -GJ        Exercise 8    Exercise Name 8 -- scapular retraction  -GJ     Cueing 8 -- Verbal;Demo  -GJ      Sets 8 -- 2  -GJ     Reps 8 -- 10  -GJ     Time 8 -- RTB  -GJ        Exercise 9    Exercise Name 9 -- UBE  -GJ     Cueing 9 -- Verbal;Demo  -GJ     Time 9 -- 4 min  -GJ        Exercise 10    Exercise Name 10 -- supine triceps  -GJ     Cueing 10 -- Verbal;Tactile;Demo  -GJ     Sets 10 -- 2  -GJ     Reps 10 -- 10  -GJ     Time 10 -- 0#  -GJ        Exercise 13    Exercise Name 13 -- shoulder ext  -GJ     Cueing 13 -- Verbal;Demo  -GJ     Sets 13 -- 2  -GJ     Reps 13 -- 10  -GJ     Time 13 -- YTB  -GJ        Exercise 14    Exercise Name 14 -- standing shoulder AAROM abd  -GJ     Cueing 14 -- Verbal;Demo  -GJ     Reps 14 -- 15  -GJ     Time 14 -- cane  -GJ           User Key  (r) = Recorded By, (t) = Taken By, (c) = Cosigned By    Initials Name Provider Type    Kota Glez, PT Physical Therapist                         Manual Rx (last 36 hours)     Manual Treatments     Row Name 02/20/23 0749             Total Minutes    25397 - PT Manual Therapy Minutes 9  -GJ         Manual Rx 2    Manual Rx 2 Location R elbow  -GJ      Manual Rx 2 Type PROM flexion/ext  -GJ         Manual Rx 3    Manual Rx 3 Location STM R biceps/triceps/pronator/supinator  -GJ            User Key  (r) = Recorded By, (t) = Taken By, (c) = Cosigned By    Initials Name Provider Type    Kota Glez, PT Physical Therapist                 PT OP Goals     Row Name 02/20/23 0700          PT Short Term Goals    STG Date to Achieve 03/03/23  -GJ     STG 1 Pt will be independent and verbalized good understanding of initial HEP to improve ability to manage pain, as well as improve strength and ROM.  -GJ     STG 1 Progress Met  -GJ     STG 2 Pt will improve R elbow AROM at least  degrees to improve ability to perform ADL's.  -GJ     STG 2 Progress Ongoing  -GJ        Long Term Goals    LTG Date to Achieve 04/02/23  -GJ     LTG 1 Pt will be independent and verbalized good understanding of advanced HEP to improve ability to manage pain, as  well as improve strength and ROM.  -GJ     LTG 1 Progress Ongoing  -GJ     LTG 2 Pt will report 1/10 pain in R elbow with overhead activities to improve participation in ADLs.  -GJ     LTG 2 Progress Ongoing  -GJ     LTG 3 Pt will demonstrate strength in R elbow of >/=4+/5 in order to participate in ADL's.  -GJ     LTG 3 Progress Ongoing  -GJ     LTG 4 Pt will able to walk her dog without any inc'd pain in R elbow to return to leisure activity and improve quality of life.  -GJ     LTG 4 Progress Ongoing  -GJ     LTG 5 Pt will improve DASH score to at least 25% perceived disability to show overall improved quality of life.  -GJ     LTG 5 Progress Ongoing  -GJ     LTG 6 Pt will demonstrate R  strength to 40-45# in order to increase ability to perform fine motor tasks including gripping and writing while participating in work related duties.  -GJ     LTG 6 Progress Met  -GJ     LTG 6 Progress Comments 40# (average of 3)  -           User Key  (r) = Recorded By, (t) = Taken By, (c) = Cosigned By    Initials Name Provider Type    Kota Glez, PT Physical Therapist                Therapy Education  Education Details: encouraged pt to walk carrying light weight (</= 5#'s) in R hand to allow for exetnsion of R elbow. Discussed using heating pad with on R elbow, and allowing for gentle stretching into extension.  Given: HEP, Symptoms/condition management, Pain management, Posture/body mechanics, Mobility training  Program: Reinforced, New, Progressed  How Provided: Verbal, Demonstration, Other (comment) (updated [employer])  Provided to: Patient  Level of Understanding: Teach back education performed, Verbalized, Demonstrated              Time Calculation:   Start Time: 0749  Stop Time: 0830  Time Calculation (min): 41 min  Timed Charges  90044 - PT Therapeutic Exercise Minutes: 17  92617 - PT Manual Therapy Minutes: 9  38882 - PT Therapeutic Activity Minutes: 15 (assessment)  Total Minutes  Timed Charges Total  Minutes: 41   Total Minutes: 41  Therapy Charges for Today     Code Description Service Date Service Provider Modifiers Qty    21056407929  PT THER PROC EA 15 MIN 2/20/2023 Kota Andrews, PT GP 1    [id number]  PT THERAPEUTIC ACT EA 15 MIN 2/20/2023 Kota Andrews, PT GP 1    [id number]  PT MANUAL THERAPY EA 15 MIN 2/20/2023 Kota Andrews, PT GP 1                    Kota Andrews, PT  2/20/2023

## 2023-03-01 ENCOUNTER — HOSPITAL ENCOUNTER (OUTPATIENT)
Dept: PHYSICAL THERAPY | Facility: HOSPITAL | Age: 33
Setting detail: THERAPIES SERIES
Discharge: HOME | End: 2023-03-01
Payer: COMMERCIAL

## 2023-03-01 DIAGNOSIS — M25.521 ELBOW PAIN, RIGHT: Primary | ICD-10-CM

## 2023-03-01 DIAGNOSIS — M25.621 DECREASED ROM OF RIGHT ELBOW: ICD-10-CM

## 2023-03-01 DIAGNOSIS — S53.104A DISLOCATION OF RIGHT ELBOW, INITIAL ENCOUNTER: ICD-10-CM

## 2023-03-01 PROCEDURE — 97110 THERAPEUTIC EXERCISES: CPT

## 2023-03-01 PROCEDURE — 97140 MANUAL THERAPY 1/> REGIONS: CPT

## 2023-03-01 NOTE — THERAPY TREATMENT NOTE
Outpatient Physical Therapy Ortho Treatment Note  [facility]     Patient Name: Shirin Mercer  : 1990  MRN: [id number]  Today's Date: 3/1/2023      Visit Date: 2023    Visit Dx:    ICD-10-CM ICD-9-CM   1. Elbow pain, right  M25.521 719.42   2. Decreased ROM of right elbow  M25.621 719.52   3. 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HCC)        Past Medical History:   Diagnosis Date   • Allergies    • GERD (gastroesophageal reflux disease)    • Type 1 diabetes mellitus (HCC)         Past Surgical History:   Procedure Laterality Date   • WISDOM TOOTH EXTRACTION                          PT Assessment/Plan     Row Name 23 1056          PT Assessment    Assessment Comments Mrs. Mercer returns for her 5th visit following R elbow D/L ~6 weeks ago.  AROM continues to improve and pt reports increased ease with eating and certain tasks at work.  Pt also tolerated increased weight and increased resistance with several exercises today, using good form.  -LP     Please refer to paper survey for additional self-reported information Yes  -LP     Rehab Potential Good  -LP     Patient/caregiver participated in establishment of treatment plan and goals Yes  -LP     Patient would benefit from skilled therapy intervention Yes  -LP        PT Plan    PT Frequency 2x/week  -LP     Predicted Duration of Therapy Intervention (PT) 2-3 weeks  -LP     PT Plan Comments Continue to progress strengthening, AROM and Functional movement patterns  -LP           User Key  (r) = Recorded By, (t) = Taken By, (c) = Cosigned By    Initials Name Provider Type    LP Yudy Magallanes, PT Physical Therapist                   OP Exercises     Row Name 23 0900 23 0800          Subjective Comments    Subjective Comments -- Its doing  better but still stiff  -LP        Subjective Pain    Able to rate subjective pain? -- yes  -LP     Pre-Treatment Pain Level -- 1  -LP        Total Minutes    45094 - PT Therapeutic Exercise Minutes -- 35  -LP     57693 - PT Manual Therapy Minutes 10  -LP --        Exercise 1    Exercise Name 1 -- pulleys  -LP     Cueing 1 -- Verbal;Demo  -LP     Time 1 -- 4 min  -LP        Exercise 4    Exercise Name 4 -- elbow flex/ext AROM  -LP     Cueing 4 -- Verbal;Demo  -LP     Sets 4 -- 2  -LP     Reps 4 -- 10  -LP     Time 4 -- 2#  -LP        Exercise 6    Exercise Name 6 -- wall slides for elbow flex/ext  -LP     Cueing 6 -- Verbal;Demo  -LP     Sets 6 -- 1  -LP     Reps 6 -- 10  -LP     Time 6 -- 5s  -LP     Additional Comments -- lift off with R at top  -LP        Exercise 8    Exercise Name 8 -- scapular retraction  -LP     Cueing 8 -- Verbal;Demo  -LP     Sets 8 -- 2  -LP     Reps 8 -- 10  -LP     Time 8 -- RTB  -LP        Exercise 9    Exercise Name 9 -- UBE  -LP     Cueing 9 -- Verbal;Demo  -LP     Time 9 -- 4 min  -LP     Additional Comments -- last min BW  -LP        Exercise 10    Exercise Name 10 -- supine triceps  -LP     Cueing 10 -- Verbal;Tactile;Demo  -LP     Sets 10 -- 2  -LP     Reps 10 -- 5  -LP     Time 10 -- 2#  -LP     Additional Comments -- Pt helpp with stabilizing upper arm  -LP        Exercise 11    Exercise Name 11 -- supine AAROM shoulder flexion with dowel  -LP     Cueing 11 -- Verbal;Tactile;Demo  -LP     Sets 11 -- 1  -LP     Reps 11 -- 10  -LP     Time 11 -- 10s  -LP     Additional Comments -- pt with full shldr flex  -LP        Exercise 13    Exercise Name 13 -- shoulder ext  -LP     Cueing 13 -- Verbal;Demo  -LP     Sets 13 -- 2  -LP     Reps 13 -- 10  -LP     Time 13 -- RTB  -LP     Additional Comments -- gave RTB fpr home  -LP        Exercise 14    Exercise Name 14 -- standing shoulder AAROM abd  -LP     Cueing 14 -- Verbal;Demo  -LP     Reps 14 -- 10  -LP        Exercise 15    Exercise  Name 15 -- prone I,T Y  -LP     Cueing 15 -- Verbal;Tactile;Demo  -LP     Sets 15 -- 1  -LP     Reps 15 -- 10,7,7  -LP     Additional Comments -- no weight  -LP           User Key  (r) = Recorded By, (t) = Taken By, (c) = Cosigned By    Initials Name Provider Type    LP Yudy Magallanes, PT Physical Therapist                         Manual Rx (last 36 hours)     Manual Treatments     Row Name 03/01/23 0900             Total Minutes    48370 - PT Manual Therapy Minutes 10  -LP         Manual Rx 1    Manual Rx 1 Location supine R elbow  -LP      Manual Rx 1 Type PROM Gentle joint mobilization- flex/ext. pron/supin  -LP         Manual Rx 2    Manual Rx 2 Location supine R shouder  -LP      Manual Rx 2 Type PROM, flex, ER  -LP            User Key  (r) = Recorded By, (t) = Taken By, (c) = Cosigned By    Initials Name Provider Type    LP Yudy Magallanes, PT Physical Therapist                 PT OP Goals     Row Name 03/01/23 1000          PT Short Term Goals    STG Date to Achieve 03/03/23  -LP     STG 1 Pt will be independent and verbalized good understanding of initial HEP to improve ability to manage pain, as well as improve strength and ROM.  -LP     STG 1 Progress Met  -LP     STG 2 Pt will improve R elbow AROM at least  degrees to improve ability to perform ADL's.  -LP     STG 2 Progress Ongoing;Progressing  -LP     STG 2 Progress Comments Did not measure today, but pt report increased ease with eating with that hand, writing on white board at wrk etc  -LP        Long Term Goals    LTG Date to Achieve 04/02/23  -LP     LTG 1 Pt will be independent and verbalized good understanding of advanced HEP to improve ability to manage pain, as well as improve strength and ROM.  -LP     LTG 1 Progress Ongoing;Progressing  -LP     LTG 2 Pt will report 1/10 pain in R elbow with overhead activities to improve participation in ADLs.  -LP     LTG 2 Progress Ongoing;Progressing  -LP     LTG 3 Pt will demonstrate strength in  R elbow of >/=4+/5 in order to participate in ADL's.  -LP     LTG 3 Progress Ongoing;Progressing  -LP     LTG 4 Pt will able to walk her dog without any inc'd pain in R elbow to return to leisure activity and improve quality of life.  -LP     LTG 4 Progress Ongoing  -LP     LTG 5 Pt will improve DASH score to at least 25% perceived disability to show overall improved quality of life.  -LP     LTG 5 Progress Ongoing  -LP     LTG 6 Pt will demonstrate R  strength to 40-45# in order to increase ability to perform fine motor tasks including gripping and writing while participating in work related duties.  -LP     LTG 6 Progress Met  -LP           User Key  (r) = Recorded By, (t) = Taken By, (c) = Cosigned By    Initials Name Provider Type    Yudy Crane PT Physical Therapist                Therapy Education  Education Details: RTB for home for scap  Given: HEP              Time Calculation:   Start Time: 0830  Stop Time: 0915  Time Calculation (min): 45 min  Timed Charges  50162 - PT Therapeutic Exercise Minutes: 35  85775 - PT Manual Therapy Minutes: 10  Total Minutes  Timed Charges Total Minutes: 10   Total Minutes: 10  Therapy Charges for Today     Code Description Service Date Service Provider Modifiers Qty    [id number]  PT THER PROC EA 15 MIN 3/1/2023 Yudy Magallanes, PT GP 2    [id number] HC PT MANUAL THERAPY EA 15 MIN 3/1/2023 Yudy Magallanes PT GP 1                    Yudy Magallanes PT  3/1/2023

## 2023-03-03 ENCOUNTER — HOSPITAL ENCOUNTER (OUTPATIENT)
Dept: PHYSICAL THERAPY | Facility: HOSPITAL | Age: 33
Setting detail: THERAPIES SERIES
Discharge: HOME | End: 2023-03-03
Payer: COMMERCIAL

## 2023-03-03 DIAGNOSIS — S53.104A DISLOCATION OF RIGHT ELBOW, INITIAL ENCOUNTER: ICD-10-CM

## 2023-03-03 DIAGNOSIS — M25.621 DECREASED ROM OF RIGHT ELBOW: ICD-10-CM

## 2023-03-03 DIAGNOSIS — M25.521 ELBOW PAIN, RIGHT: Primary | ICD-10-CM

## 2023-03-03 PROCEDURE — 97110 THERAPEUTIC EXERCISES: CPT | Performed by: PHYSICAL THERAPIST

## 2023-03-03 PROCEDURE — 97530 THERAPEUTIC ACTIVITIES: CPT | Performed by: PHYSICAL THERAPIST

## 2023-03-03 NOTE — THERAPY PROGRESS REPORT/RE-CERT
Outpatient Physical Therapy Ortho Progress Note  [facility]     Patient Name: Shirin Mercer  : 1990  MRN: [id number]  Today's Date: 3/3/2023      Visit Date: 2023    Visit Dx:    ICD-10-CM ICD-9-CM   1. Elbow pain, right  M25.521 719.42   2. Decreased ROM of right elbow  M25.621 719.52   3. 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[facility])        Past Medical History:   Diagnosis Date   • Allergies    • GERD (gastroesophageal reflux disease)    • Type 1 diabetes mellitus (HCC)         Past Surgical History:   Procedure Laterality Date   • WISDOM TOOTH EXTRACTION          PT Ortho     Row Name 23 0700       Right Upper Ext    Rt Shoulder Abduction AROM 155 seated  -GJ    Rt Shoulder Flexion AROM 150 seated  -GJ    Rt Shoulder Internal Rotation AROM FIR midline/contralateral T 9  -GJ    Rt Elbow Extension/Flexion AROM   -GJ    Rt Elbow Extension/Flexion PROM 15 short of full ext  -GJ       MMT Right Upper Ext    Rt Elbow Flexion MMT, Gross Movement: (4/5) good  -GJ    Rt Elbow Extension MMT, Gross Movement: (4/5) good  -GJ          User Key  (r) = Recorded By, (t) = Taken By, (c) = Cosigned By    Initials Name Provider Type     Kota Andrews, PT Physical Therapist                             PT Assessment/Plan     Row Name 23 0809          PT Assessment    Functional Limitations Limitation in home management;Limitations in community activities;Performance in leisure activities;Performance in work activities;Performance in sport activities  -     Impairments Impaired flexibility;Impaired muscle endurance;Impaired muscle length;Impaired muscle power;Impaired postural alignment;Joint mobility;Muscle strength;Pain;Poor body mechanics;Posture;Range of motion  -     Assessment Comments Ms.  Sylvie is a 32 y/o R handed female. 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 She is 7 weeks sp R posterior elbow dislocation. She has attended 6 sessions of physical therapy to date. Progress towards goals is good (given restrictions/time frame from injury), having met1 of 2 STG's (second STG is partially met), 1 of 6 LTG's. She does demonstrate improving R shoulder and elbow AROM, improving strength. She does demonstrate limited R elbow extension both actively/passively. She may be a candidate for a dynasplint to optimize elbow extension. Ms. Mercer continues to be an excellent candidate for skilled physical therapy.  -     Please refer to paper survey for additional self-reported information Yes  -GJ     Rehab Potential Excellent  -GJ     Patient/caregiver participated in establishment of treatment plan and goals Yes  -GJ     Patient would benefit from skilled therapy intervention Yes  -GJ        PT Plan    PT Frequency 1x/week;2x/week  -GJ     Predicted Duration of Therapy Intervention (PT) 10 visits  -GJ     Planned CPT's? PT RE-EVAL: 09915;PT THER PROC EA 15 MIN: 08834;PT THER ACT EA 15 MIN: 19962;PT MANUAL THERAPY EA 15 MIN: 85283;PT NEUROMUSC RE-EDUCATION EA 15 MIN: 93397;PT HOT OR COLD PACK TREAT MCARE;PT ELECTRICAL STIM UNATTEND: ;PT ULTRASOUND EA 15 MIN: 89645  -     PT Plan Comments assess response to new exercises, check on status of dynasplilnt, continue to work on scapular girdle strenght biceps/tricps strength, ROM. Consider D2 flexion addition to HA,  -           User Key  (r) = Recorded By, (t) = Taken By, (c) = Cosigned By    Initials Name Provider Type    Kota Glez, PT Physical Therapist                   OP Exercises     Row Name 03/03/23 0755 03/03/23 0700          Subjective Comments    Subjective Comments -- i was sore after last time. I feel like I'm doing better  -GJ        Total Minutes    03199 - PT Therapeutic Exercise Minutes 30  -GJ --     51849 - PT Therapeutic  Activity Minutes 12  assessment  -GJ --        Exercise 1    Exercise Name 1 -- pulleys  -GJ     Cueing 1 -- Verbal;Demo  -GJ     Time 1 -- 4 min  -GJ        Exercise 4    Exercise Name 4 -- elbow flex/ext AROM  -GJ     Cueing 4 -- Verbal;Demo  -GJ     Sets 4 -- 2  -GJ     Reps 4 -- 20  -GJ     Time 4 -- 4#  -GJ        Exercise 8    Exercise Name 8 -- scapular retraction (low, high)  -GJ     Cueing 8 -- Verbal;Demo  -GJ     Sets 8 -- 2, each  -GJ     Reps 8 -- 10  -GJ     Time 8 -- RTB  -GJ        Exercise 9    Exercise Name 9 -- UBE  -GJ     Cueing 9 -- Verbal;Demo  -GJ     Time 9 -- 4 min  -GJ        Exercise 10    Exercise Name 10 -- supine triceps  -GJ     Cueing 10 -- Verbal;Tactile;Demo  -GJ     Sets 10 -- 2  -GJ     Reps 10 -- 5  -GJ     Time 10 -- 2#  -GJ        Exercise 13    Exercise Name 13 -- shoulder ext  -GJ     Cueing 13 -- Verbal;Demo  -GJ     Sets 13 -- 2  -GJ     Reps 13 -- 10  -GJ     Time 13 -- RTB  -GJ        Exercise 14    Exercise Name 14 -- standing shoulder ABD  -GJ     Cueing 14 -- Verbal;Demo  -GJ     Sets 14 -- 2  -GJ     Reps 14 -- 10  -GJ     Time 14 -- 1#  -GJ     Additional Comments -- mirror for cuingto avoid sapular elevation  -GJ        Exercise 15    Exercise Name 15 -- prone I,T Y  -GJ     Additional Comments -- next session  -GJ        Exercise 16    Exercise Name 16 -- standing shoulder HA  -GJ     Cueing 16 -- Verbal;Demo  -GJ     Reps 16 -- 10  -GJ     Time 16 -- YTB  -GJ           User Key  (r) = Recorded By, (t) = Taken By, (c) = Cosigned By    Initials Name Provider Type    GJ Kota Andrews W, PT Physical Therapist                              PT OP Goals     Row Name 03/03/23 0700          PT Short Term Goals    STG Date to Achieve 03/03/23  -GJ     STG 1 Pt will be independent and verbalized good understanding of initial HEP to improve ability to manage pain, as well as improve strength and ROM.  -GJ     STG 1 Progress Met  -GJ     STG 2 Pt will improve R elbow AROM at  least  degrees to improve ability to perform ADL's.  -GJ     STG 2 Progress Progressing;Partially Met  -GJ     STG 2 Progress Comments see ortho  -GJ        Long Term Goals    LTG Date to Achieve 04/02/23  -GJ     LTG 1 Pt will be independent and verbalized good understanding of advanced HEP to improve ability to manage pain, as well as improve strength and ROM.  -GJ     LTG 1 Progress Ongoing;Progressing  -GJ     LTG 2 Pt will report 1/10 pain in R elbow with overhead activities to improve participation in ADLs.  -GJ     LTG 2 Progress Ongoing;Progressing  -GJ     LTG 2 Progress Comments 1-2/10 with over head ativities  -GJ     LTG 3 Pt will demonstrate strength in R elbow of >/=4+/5 in order to participate in ADL's.  -GJ     LTG 3 Progress Ongoing;Progressing  -GJ     LTG 3 Progress Comments see ortho  -GJ     LTG 4 Pt will able to walk her dog without any inc'd pain in R elbow to return to leisure activity and improve quality of life.  -GJ     LTG 4 Progress Partially Met  -GJ     LTG 4 Progress Comments reports she is walking short distances, no real increase in pain, using leash  -GJ     LTG 5 Pt will improve DASH score to at least 25% perceived disability to show overall improved quality of life.  -GJ     LTG 5 Progress Ongoing  -GJ     LTG 6 Pt will demonstrate R  strength to 40-45# in order to increase ability to perform fine motor tasks including gripping and writing while participating in work related duties.  -GJ     LTG 6 Progress Met  -           User Key  (r) = Recorded By, (t) = Taken By, (c) = Cosigned By    Initials Name Provider Type    Kota Glez, PT Physical Therapist                Therapy Education  Education Details: discussed possible dynasplint for end range extension, i will go ahead and pass her information along to dynasplint rep to assess insurance. updated HEP, updated schedule  Given: HEP, Symptoms/condition management, Pain management, Posture/body mechanics,  Mobility training  Program: Reinforced, Progressed, New, Modified  How Provided: Verbal, Demonstration, Written  Provided to: Patient  Level of Understanding: Teach back education performed, Verbalized, Demonstrated              Time Calculation:   Start Time: 0749  Stop Time: 0831  Time Calculation (min): 42 min  Timed Charges  38812 - PT Therapeutic Exercise Minutes: 30  93236 - PT Therapeutic Activity Minutes: 12 (assessment)  Total Minutes  Timed Charges Total Minutes: 42   Total Minutes: 42  Therapy Charges for Today     Code Description Service Date Service Provider Modifiers Qty    74054407472  PT THER PROC EA 15 MIN 3/3/2023 Kota Andrews, PT GP 2    [id number]  PT THERAPEUTIC ACT EA 15 MIN 3/3/2023 Kota Andrews, PT GP 1                    Kota Andrews, PT  3/3/2023

## 2023-03-08 ENCOUNTER — HOSPITAL ENCOUNTER (OUTPATIENT)
Dept: PHYSICAL THERAPY | Facility: HOSPITAL | Age: 33
Setting detail: THERAPIES SERIES
Discharge: HOME | End: 2023-03-08
Payer: COMMERCIAL

## 2023-03-08 DIAGNOSIS — M25.521 ELBOW PAIN, RIGHT: Primary | ICD-10-CM

## 2023-03-08 DIAGNOSIS — S53.104A DISLOCATION OF RIGHT ELBOW, INITIAL ENCOUNTER: ICD-10-CM

## 2023-03-08 DIAGNOSIS — M25.621 DECREASED ROM OF RIGHT ELBOW: ICD-10-CM

## 2023-03-08 PROCEDURE — 97140 MANUAL THERAPY 1/> REGIONS: CPT | Performed by: PHYSICAL THERAPIST

## 2023-03-08 NOTE — THERAPY TREATMENT NOTE
Outpatient Physical Therapy Ortho Treatment Note  [facility]     Patient Name: Shirin Mercer  : 1990  MRN: [id number]  Today's Date: 3/8/2023      Visit Date: 2023    Visit Dx:    ICD-10-CM ICD-9-CM   1. Elbow pain, right  M25.521 719.42   2. Decreased ROM of right elbow  M25.621 719.52   3. 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HCC)        Past Medical History:   Diagnosis Date   • Allergies    • GERD (gastroesophageal reflux disease)    • Type 1 diabetes mellitus (HCC)         Past Surgical History:   Procedure Laterality Date   • WISDOM TOOTH EXTRACTION                          PT Assessment/Plan     Row Name 23 0832          PT Assessment    Assessment Comments Ms. Mercer returns this morning reporting her blood sugar dropped significantly this AM. Secondary blood sugar issues, we held on active strengthening activities and performed manual mobs/PROM particularly for elbow extension. She will hold on getting dynasplint but will perform dedicated elbow extension PROM (see education section for description).  Ms. Mercer continues to be a good candidate for skilled physical therapy.  -GJ        PT Plan    PT Plan Comments assess if pt performing prolonged static stretching into extension, resume strengthening activities for scapular girdle/elbow, consider D2  -GJ           User Key  (r) = Recorded By, (t) = Taken By, (c) = Cosigned By    Initials Name Provider Type    Kota Glez, PT Physical Therapist                   OP Exercises     Row Name 23 0754 23 0700          Subjective Comments    Subjective Comments -- sorry i'm late, my blood sugar dropped on me this morning  -GJ        Total Minutes    18185 - PT Manual Therapy Minutes 30  -GJ --        Exercise 9    Exercise  Name 9 -- UBE  -GJ     Time 9 -- 4 min  -GJ           User Key  (r) = Recorded By, (t) = Taken By, (c) = Cosigned By    Initials Name Provider Type    Kota Glez, PT Physical Therapist                         Manual Rx (last 36 hours)     Manual Treatments     Row Name 03/08/23 0754             Total Minutes    10346 - PT Manual Therapy Minutes 30  -GJ         Manual Rx 1    Manual Rx 1 Location supine R elbow  -GJ      Manual Rx 1 Type PROM joint mobilization- flex/ext. pron/supin  -GJ      Manual Rx 1 Grade with increaed end range extension time  -GJ         Manual Rx 2    Manual Rx 2 Location supine R shouder  -GJ      Manual Rx 2 Type PROM, flex, ER  -GJ            User Key  (r) = Recorded By, (t) = Taken By, (c) = Cosigned By    Initials Name Provider Type    Kota Glez, PT Physical Therapist                 PT OP Goals     Row Name 03/08/23 0700          PT Short Term Goals    STG Date to Achieve 03/03/23  -GJ     STG 1 Pt will be independent and verbalized good understanding of initial HEP to improve ability to manage pain, as well as improve strength and ROM.  -GJ     STG 1 Progress Met  -GJ     STG 2 Pt will improve R elbow AROM at least  degrees to improve ability to perform ADL's.  -GJ     STG 2 Progress Progressing;Partially Met  -GJ        Long Term Goals    LTG Date to Achieve 04/02/23  -GJ     LTG 1 Pt will be independent and verbalized good understanding of advanced HEP to improve ability to manage pain, as well as improve strength and ROM.  -GJ     LTG 1 Progress Ongoing;Progressing  -GJ     LTG 2 Pt will report 1/10 pain in R elbow with overhead activities to improve participation in ADLs.  -GJ     LTG 2 Progress Ongoing;Progressing  -GJ     LTG 3 Pt will demonstrate strength in R elbow of >/=4+/5 in order to participate in ADL's.  -GJ     LTG 3 Progress Ongoing;Progressing  -GJ     LTG 4 Pt will able to walk her dog without any inc'd pain in R elbow to return to leisure  activity and improve quality of life.  -     LTG 4 Progress Partially Met  -     LTG 5 Pt will improve DASH score to at least 25% perceived disability to show overall improved quality of life.  -     LTG 5 Progress Ongoing  -     LTG 6 Pt will demonstrate R  strength to 40-45# in order to increase ability to perform fine motor tasks including gripping and writing while participating in work related duties.  -     LTG 6 Progress Met  -           User Key  (r) = Recorded By, (t) = Taken By, (c) = Cosigned By    Initials Name Provider Type    Kota Glez, PT Physical Therapist                Therapy Education  Education Details: no change to HEP, discussed dynasplint and co pay. Encouraged pt to perform static stretching into extension, towel roll under distal humerus,, 1-2# ankle weight at wrist with heat pack around elbow, perform for 30 min. 1-2 x's per day  Given: HEP, Symptoms/condition management, Pain management, Posture/body mechanics, Mobility training  Program: Reinforced  How Provided: Verbal  Provided to: Patient  Level of Understanding: Verbalized, Teach back education performed, Demonstrated              Time Calculation:   Start Time: 0754 (appt time 0745)  Stop Time: 0830  Time Calculation (min): 36 min  Timed Charges  39256 - PT Manual Therapy Minutes: 30  Total Minutes  Timed Charges Total Minutes: 30   Total Minutes: 30  Therapy Charges for Today     Code Description Service Date Service Provider Modifiers Qty    [id number] HC PT MANUAL THERAPY EA 15 MIN 3/8/2023 Kota Andrews, PT GP 2                    Kota Andrews PT  3/8/2023

## 2023-03-10 ENCOUNTER — HOSPITAL ENCOUNTER (OUTPATIENT)
Dept: PHYSICAL THERAPY | Facility: HOSPITAL | Age: 33
Setting detail: THERAPIES SERIES
Discharge: HOME | End: 2023-03-10
Payer: COMMERCIAL

## 2023-03-10 DIAGNOSIS — M25.621 DECREASED ROM OF RIGHT ELBOW: ICD-10-CM

## 2023-03-10 DIAGNOSIS — S53.104A DISLOCATION OF RIGHT ELBOW, INITIAL ENCOUNTER: ICD-10-CM

## 2023-03-10 DIAGNOSIS — M25.521 ELBOW PAIN, RIGHT: Primary | ICD-10-CM

## 2023-03-10 PROCEDURE — 97140 MANUAL THERAPY 1/> REGIONS: CPT

## 2023-03-10 PROCEDURE — 97110 THERAPEUTIC EXERCISES: CPT

## 2023-03-10 NOTE — THERAPY TREATMENT NOTE
Outpatient Physical Therapy Ortho Treatment Note  [facility]     Patient Name: Shirin Mercer  : 1990  MRN: [id number]  Today's Date: 3/10/2023      Visit Date: 03/10/2023    Visit Dx:    ICD-10-CM ICD-9-CM   1. Elbow pain, right  M25.521 719.42   2. Decreased ROM of right elbow  M25.621 719.52   3. 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HCC)        Past Medical History:   Diagnosis Date   • Allergies    • GERD (gastroesophageal reflux disease)    • Type 1 diabetes mellitus (HCC)         Past Surgical History:   Procedure Laterality Date   • WISDOM TOOTH EXTRACTION                          PT Assessment/Plan     Row Name 03/10/23 0800          PT Assessment    Assessment Comments Ms. Mercer reports good tolerance for previous session and has been compliant with long duration low load passive stretches. She feels 80% back to PLOF, weakness is the largest remaining limiting factor. Decreased time spent on manual therapy and resumed strengthening. Added supine D 2 shoulder flexion, weighted carry, wall push up, and shouler taps all with good tolerance. Pt demonstrates improvement in R shoulder ext AROM this date compared to previous. She remains appropriate for skilled.  -RS        PT Plan    PT Plan Comments Cont to progress closed chain as tolerance allows  -RS           User Key  (r) = Recorded By, (t) = Taken By, (c) = Cosigned By    Initials Name Provider Type    RS Yissel Morton, PT Physical Therapist                   OP Exercises     Row Name 03/10/23 0700             Subjective Comments    Subjective Comments Pt reports good tolerance for previous session, she has been using a heating pad and lettingn her arm be straight at night  -RS         Total Minutes    79887 - PT Therapeutic Exercise Minutes 29   -RS      49524 - PT Manual Therapy Minutes 12  -RS         Exercise 1    Exercise Name 1 UBE  -RS      Reps 1 4  -RS      Time 1 2/2  -RS         Exercise 2    Exercise Name 2 supine tricep ext  -RS      Cueing 2 Verbal;Demo  -RS      Sets 2 2  -RS      Reps 2 8  -RS      Time 2 2#  -RS         Exercise 3    Exercise Name 3 supine D2 shoulder flex  -RS      Cueing 3 Verbal;Demo  -RS      Sets 3 2  -RS      Reps 3 10  -RS      Time 3 RTB  -RS         Exercise 4    Exercise Name 4 standing shoulder HA  -RS      Cueing 4 Verbal;Demo  -RS      Sets 4 2  -RS      Reps 4 10  -RS      Time 4 RTB  -RS         Exercise 5    Exercise Name 5 prone I,T,Y  -RS      Cueing 5 Verbal  -RS      Sets 5 2  -RS      Reps 5 5  -RS      Time 5 5  -RS         Exercise 6    Exercise Name 6 rows and ext  -RS      Cueing 6 Verbal;Demo  -RS      Sets 6 2  -RS      Reps 6 10  -RS      Time 6 GTB  -RS         Exercise 7    Exercise Name 7 bicep curl- 3 ways  -RS      Cueing 7 Verbal;Demo  -RS      Sets 7 3  -RS      Reps 7 10  -RS      Time 7 hammer, neutral, pro  -RS         Exercise 8    Exercise Name 8 suitcase carry  -RS      Cueing 8 Verbal;Demo  -RS      Sets 8 --  -RS      Reps 8 3 laps  -RS      Time 8 6#  -RS         Exercise 9    Exercise Name 9 shoulder taps at wall  -RS      Cueing 9 Verbal;Demo  -RS      Reps 9 10  -RS      Time 9 --  -RS         Exercise 10    Exercise Name 10 wall push ups  -RS      Cueing 10 Verbal;Demo  -RS      Sets 10 --  -RS      Reps 10 10  -RS      Time 10 --  -RS         Exercise 13    Exercise Name 13 --  -RS      Cueing 13 --  -RS      Sets 13 --  -RS      Reps 13 --  -RS      Time 13 --  -RS         Exercise 14    Exercise Name 14 --  -RS      Cueing 14 --  -RS      Sets 14 --  -RS      Reps 14 --  -RS      Time 14 --  -RS         Exercise 15    Exercise Name 15 --  -RS         Exercise 16    Exercise Name 16 --  -RS      Cueing 16 --  -RS      Reps 16 --  -RS      Time 16 --  -RS            User Key   (r) = Recorded By, (t) = Taken By, (c) = Cosigned By    Initials Name Provider Type    RS Yissel Morton, PT Physical Therapist                         Manual Rx (last 36 hours)     Manual Treatments     Row Name 03/10/23 0700             Total Minutes    81475 - PT Manual Therapy Minutes 12  -RS         Manual Rx 1    Manual Rx 1 Location supine R elbow  -RS      Manual Rx 1 Type PROM joint mobilization- flex/ext. pron/supin  -RS      Manual Rx 1 Grade with increaed end range extension time  -RS         Manual Rx 2    Manual Rx 2 Location supine R shouder  -RS      Manual Rx 2 Type PROM, flex, ER  -RS            User Key  (r) = Recorded By, (t) = Taken By, (c) = Cosigned By    Initials Name Provider Type    RS Yissel Morton, PT Physical Therapist                 PT OP Goals     Row Name 03/10/23 0800          PT Short Term Goals    STG Date to Achieve 03/03/23  -RS     STG 1 Pt will be independent and verbalized good understanding of initial HEP to improve ability to manage pain, as well as improve strength and ROM.  -RS     STG 1 Progress Met  -RS     STG 2 Pt will improve R elbow AROM at least  degrees to improve ability to perform ADL's.  -RS     STG 2 Progress Progressing;Partially Met  -RS        Long Term Goals    LTG Date to Achieve 04/02/23  -RS     LTG 1 Pt will be independent and verbalized good understanding of advanced HEP to improve ability to manage pain, as well as improve strength and ROM.  -RS     LTG 1 Progress Ongoing;Progressing  -RS     LTG 2 Pt will report 1/10 pain in R elbow with overhead activities to improve participation in ADLs.  -RS     LTG 2 Progress Ongoing;Progressing  -RS     LTG 3 Pt will demonstrate strength in R elbow of >/=4+/5 in order to participate in ADL's.  -RS     LTG 3 Progress Ongoing;Progressing  -RS     LTG 4 Pt will able to walk her dog without any inc'd pain in R elbow to return to leisure activity and improve quality of life.  -RS     LTG 4 Progress  Partially Met  -RS     LTG 5 Pt will improve DASH score to at least 25% perceived disability to show overall improved quality of life.  -RS     LTG 5 Progress Ongoing  -RS     LTG 6 Pt will demonstrate R  strength to 40-45# in order to increase ability to perform fine motor tasks including gripping and writing while participating in work related duties.  -RS     LTG 6 Progress Met  -RS           User Key  (r) = Recorded By, (t) = Taken By, (c) = Cosigned By    Initials Name Provider Type    RS Yissel Morton PT Physical Therapist                Therapy Education  Given: HEP  Program: Reinforced  How Provided: Verbal, Demonstration  Provided to: Patient  Level of Understanding: Verbalized, Demonstrated              Time Calculation:   Start Time: 0746  Stop Time: 0830  Time Calculation (min): 44 min  Timed Charges  01739 - PT Therapeutic Exercise Minutes: 29  94687 - PT Manual Therapy Minutes: 12  Total Minutes  Timed Charges Total Minutes: 41   Total Minutes: 41  Therapy Charges for Today     Code Description Service Date Service Provider Modifiers Qty    [id number]  PT THER PROC EA 15 MIN 3/10/2023 Yissel Morton PT GP 2    70099986212 HC PT MANUAL THERAPY EA 15 MIN 3/10/2023 Yissel Morton PT GP 1                    Yissel Morton PT  3/10/2023

## 2023-05-02 DIAGNOSIS — E10.65 TYPE 1 DIABETES MELLITUS WITH HYPERGLYCEMIA: ICD-10-CM

## 2023-05-02 RX ORDER — PROCHLORPERAZINE 25 MG/1
1 SUPPOSITORY RECTAL
Qty: 1 EACH | Refills: 0 | Status: SHIPPED | OUTPATIENT
Start: 2023-05-02

## 2023-06-07 ENCOUNTER — TELEPHONE (OUTPATIENT)
Dept: ENDOCRINOLOGY | Age: 33
End: 2023-06-07

## 2023-06-07 NOTE — TELEPHONE ENCOUNTER
Caller: Shirin Mercer    Relationship: Self    Best call back number: [phone]    Who is your current provider: KAYLI     Who would you like your new provider to be: ANYONE THAT CAN SEE PT ON A WEDNESDAY     What are your reasons for transferring care: PROVIDER NO LONGER IN PRACTICE

## 2023-09-10 DIAGNOSIS — E10.65 TYPE 1 DIABETES MELLITUS WITH HYPERGLYCEMIA: ICD-10-CM

## 2023-09-11 RX ORDER — PROCHLORPERAZINE 25 MG/1
SUPPOSITORY RECTAL
Qty: 9 EACH | Refills: 3 | Status: SHIPPED | OUTPATIENT
Start: 2023-09-11

## 2023-10-23 ENCOUNTER — LAB (OUTPATIENT)
Dept: LAB | Facility: HOSPITAL | Age: 33
End: 2023-10-23
Payer: COMMERCIAL

## 2023-10-23 DIAGNOSIS — E10.65 TYPE 1 DIABETES MELLITUS WITH HYPERGLYCEMIA: ICD-10-CM

## 2023-10-23 LAB
ALBUMIN SERPL-MCNC: 4.5 G/DL (ref 3.5–5.2)
ALBUMIN UR-MCNC: <1.2 MG/DL
ALBUMIN/GLOB SERPL: 2.1 G/DL
ALP SERPL-CCNC: 89 U/L (ref 39–117)
ALT SERPL W P-5'-P-CCNC: 25 U/L (ref 1–33)
ANION GAP SERPL CALCULATED.3IONS-SCNC: 10.6 MMOL/L (ref 5–15)
AST SERPL-CCNC: 22 U/L (ref 1–32)
BILIRUB SERPL-MCNC: 0.6 MG/DL (ref 0–1.2)
BUN SERPL-MCNC: 8 MG/DL (ref 6–20)
BUN/CREAT SERPL: 10.3 (ref 7–25)
CALCIUM SPEC-SCNC: 9.4 MG/DL (ref 8.6–10.5)
CHLORIDE SERPL-SCNC: 100 MMOL/L (ref 98–107)
CHOLEST SERPL-MCNC: 167 MG/DL (ref 0–200)
CO2 SERPL-SCNC: 25.4 MMOL/L (ref 22–29)
CREAT SERPL-MCNC: 0.78 MG/DL (ref 0.57–1)
CREAT UR-MCNC: 123.7 MG/DL
EGFRCR SERPLBLD CKD-EPI 2021: 101.7 ML/MIN/1.73
GLOBULIN UR ELPH-MCNC: 2.1 GM/DL
GLUCOSE SERPL-MCNC: 335 MG/DL (ref 65–99)
HBA1C MFR BLD: 9.3 % (ref 4.8–5.6)
HDLC SERPL-MCNC: 69 MG/DL (ref 40–60)
LDLC SERPL CALC-MCNC: 79 MG/DL (ref 0–100)
LDLC/HDLC SERPL: 1.11 {RATIO}
MICROALBUMIN/CREAT UR: NORMAL MG/G{CREAT}
POTASSIUM SERPL-SCNC: 4.5 MMOL/L (ref 3.5–5.2)
PROT SERPL-MCNC: 6.6 G/DL (ref 6–8.5)
SODIUM SERPL-SCNC: 136 MMOL/L (ref 136–145)
TRIGL SERPL-MCNC: 107 MG/DL (ref 0–150)
TSH SERPL DL<=0.05 MIU/L-ACNC: 2.15 UIU/ML (ref 0.27–4.2)
VLDLC SERPL-MCNC: 19 MG/DL (ref 5–40)

## 2023-10-23 PROCEDURE — 36415 COLL VENOUS BLD VENIPUNCTURE: CPT

## 2023-10-23 PROCEDURE — 82043 UR ALBUMIN QUANTITATIVE: CPT

## 2023-10-23 PROCEDURE — 80053 COMPREHEN METABOLIC PANEL: CPT

## 2023-10-23 PROCEDURE — 80061 LIPID PANEL: CPT

## 2023-10-23 PROCEDURE — 84443 ASSAY THYROID STIM HORMONE: CPT

## 2023-10-23 PROCEDURE — 82570 ASSAY OF URINE CREATININE: CPT

## 2023-10-23 PROCEDURE — 83036 HEMOGLOBIN GLYCOSYLATED A1C: CPT

## 2023-10-30 DIAGNOSIS — E10.65 TYPE 1 DIABETES MELLITUS WITH HYPERGLYCEMIA: ICD-10-CM

## 2023-10-31 ENCOUNTER — OFFICE VISIT (OUTPATIENT)
Dept: ENDOCRINOLOGY | Age: 33
End: 2023-10-31
Payer: COMMERCIAL

## 2023-10-31 VITALS
HEIGHT: 67 IN | OXYGEN SATURATION: 98 % | WEIGHT: 178.2 LBS | SYSTOLIC BLOOD PRESSURE: 118 MMHG | DIASTOLIC BLOOD PRESSURE: 78 MMHG | BODY MASS INDEX: 27.97 KG/M2 | HEART RATE: 83 BPM

## 2023-10-31 DIAGNOSIS — E10.9 TYPE 1 DIABETES MELLITUS WITHOUT COMPLICATION: ICD-10-CM

## 2023-10-31 PROCEDURE — 99214 OFFICE O/P EST MOD 30 MIN: CPT | Performed by: INTERNAL MEDICINE

## 2023-10-31 PROCEDURE — 95251 CONT GLUC MNTR ANALYSIS I&R: CPT | Performed by: INTERNAL MEDICINE

## 2023-10-31 RX ORDER — ACYCLOVIR 400 MG/1
1 TABLET ORAL
Qty: 3 EACH | Refills: 11 | Status: SHIPPED | OUTPATIENT
Start: 2023-10-31

## 2023-10-31 RX ORDER — INSULIN ASPART 100 [IU]/ML
INJECTION, SOLUTION INTRAVENOUS; SUBCUTANEOUS
Qty: 45 ML | Refills: 1 | Status: SHIPPED | OUTPATIENT
Start: 2023-10-31

## 2023-10-31 NOTE — PROGRESS NOTES
Chief complaint:  T1DM    HPI:   - 34 year old female here for management of diabetes mellitus type 1  - Was last seen in 6/2023  - Has had diabetes since 2021  - No known complications to date  - Is currently taking Lantus 25 units qhs and Novolog 1 unit for every 20 grams of carbohydrate and 1 unit for every 30 in BG above 150  - Denies hypoglycemia  - She uses a DexCom G6 CGM    The following portions of the patient's history were reviewed and updated as appropriate: allergies, current medications, past family history, past medical history, past social history, past surgical history, and problem list.    Objective     Vitals:    10/31/23 0923   BP: 118/78   Pulse: 83   SpO2: 98%        Physical Exam  Vitals reviewed.   Constitutional:       Appearance: Normal appearance.   HENT:      Head: Normocephalic and atraumatic.   Eyes:      General: No scleral icterus.  Pulmonary:      Effort: Pulmonary effort is normal. No respiratory distress.   Neurological:      Mental Status: She is alert.      Gait: Gait normal.   Psychiatric:         Mood and Affect: Mood normal.         Behavior: Behavior normal.         Thought Content: Thought content normal.         Judgment: Judgment normal.       Labs/Imaging:  A1c was 9.3%, CMP was unremarkable in 10/2023    CGM interpretation  Dates reviewed:  10/18-10/31/23  Data:  Avg of 220, , 20% time in range  Interpretation:  BG typically running above 200 on average      Assessment & Plan   Type 1 DM, uncontrolled due to hyperglycemia at risk for complication  - Cont. Lantus to 25 units qhs  - Change Novolog 1 unit for every 18 grams of carbohydrate and 1 unit for every 30 in BG above 150  - I did send in a prescription for DexCom G7     - Return to clinic in 4 months

## 2023-11-01 RX ORDER — PROCHLORPERAZINE 25 MG/1
1 SUPPOSITORY RECTAL
Qty: 1 EACH | Refills: 0 | OUTPATIENT
Start: 2023-11-01

## 2023-11-15 ENCOUNTER — FLU SHOT (OUTPATIENT)
Dept: INTERNAL MEDICINE | Facility: CLINIC | Age: 33
End: 2023-11-15
Payer: COMMERCIAL

## 2023-11-15 DIAGNOSIS — Z23 NEED FOR INFLUENZA VACCINATION: Primary | ICD-10-CM

## 2023-12-18 ENCOUNTER — PATIENT MESSAGE (OUTPATIENT)
Dept: ENDOCRINOLOGY | Age: 33
End: 2023-12-18
Payer: COMMERCIAL

## 2023-12-18 DIAGNOSIS — E10.9 TYPE 1 DIABETES MELLITUS WITHOUT COMPLICATION: ICD-10-CM

## 2024-01-02 RX ORDER — ACYCLOVIR 400 MG/1
1 TABLET ORAL
Qty: 3 EACH | Refills: 11 | Status: SHIPPED | OUTPATIENT
Start: 2024-01-02 | End: 2024-01-08 | Stop reason: SDUPTHER

## 2024-01-02 NOTE — TELEPHONE ENCOUNTER
From: Shirin Mercer  To: Ramos Perez  Sent: 12/18/2023 11:51 AM EST  Subject: Work excuse    Hi Dr. Perez,   I am trying to file for fmla just to protect my position at work when I am sick/having trouble balancing my sugar. I think they were going to fax over paperwork but not sure what all I need to do. Please let me know and I will follow up. Thank you so much!  Shirin

## 2024-01-08 DIAGNOSIS — E10.9 TYPE 1 DIABETES MELLITUS WITHOUT COMPLICATION: ICD-10-CM

## 2024-01-08 RX ORDER — ACYCLOVIR 400 MG/1
1 TABLET ORAL
Qty: 9 EACH | Refills: 3 | Status: SHIPPED | OUTPATIENT
Start: 2024-01-08

## 2024-03-19 ENCOUNTER — LAB (OUTPATIENT)
Dept: LAB | Facility: HOSPITAL | Age: 34
End: 2024-03-19
Payer: COMMERCIAL

## 2024-03-19 PROCEDURE — 83036 HEMOGLOBIN GLYCOSYLATED A1C: CPT | Performed by: INTERNAL MEDICINE

## 2024-03-20 ENCOUNTER — OFFICE VISIT (OUTPATIENT)
Dept: ENDOCRINOLOGY | Age: 34
End: 2024-03-20
Payer: COMMERCIAL

## 2024-03-20 VITALS
SYSTOLIC BLOOD PRESSURE: 124 MMHG | WEIGHT: 174.8 LBS | BODY MASS INDEX: 27.44 KG/M2 | DIASTOLIC BLOOD PRESSURE: 80 MMHG | OXYGEN SATURATION: 97 % | HEIGHT: 67 IN | HEART RATE: 91 BPM

## 2024-03-20 DIAGNOSIS — E10.65 TYPE 1 DIABETES MELLITUS WITH HYPERGLYCEMIA: Primary | ICD-10-CM

## 2024-03-20 PROCEDURE — 99214 OFFICE O/P EST MOD 30 MIN: CPT | Performed by: INTERNAL MEDICINE

## 2024-03-20 NOTE — PROGRESS NOTES
Chief complaint/Reason for consult:  T1DM    HPI:   - 34 year old female here for management of diabetes mellitus type 1  - Was last seen in 10/2023  - No changes since last visit  - Has had diabetes since 2021  - No known complications to date  - Is currently taking Lantus 25 units qhs and Novolog 1 unit for every 20 grams of carbohydrate and 1 unit for every 30 in BG above 150  - Denies hypoglycemia  - She uses a DexCom G7 CGM       The following portions of the patient's history were reviewed and updated as appropriate: allergies, current medications, past family history, past medical history, past social history, past surgical history, and problem list.    Objective     Vitals:    03/20/24 1006   BP: 124/80   Pulse: 91   SpO2: 97%        Physical Exam  Vitals reviewed.   Constitutional:       Appearance: Normal appearance.   HENT:      Head: Normocephalic and atraumatic.   Eyes:      General: No scleral icterus.  Pulmonary:      Effort: Pulmonary effort is normal. No respiratory distress.   Neurological:      Mental Status: She is alert.      Gait: Gait normal.   Psychiatric:         Mood and Affect: Mood normal.         Behavior: Behavior normal.         Thought Content: Thought content normal.         Judgment: Judgment normal.         Labs/Imaging:  A1c was 9.2%    Assessment & Plan   Type 1 DM, uncontrolled due to hyperglycemia at risk for complication  - Cont. Lantus to 25 units qhs  - Change Novolog 1 unit for every 15 grams of carbohydrate and 1 unit for every 30 in BG above 150  - She did express interest in an insulin pump     - Return to clinic in 4 months

## 2024-03-27 RX ORDER — IBUPROFEN 100 MG/5ML
1 SUSPENSION ORAL TAKE AS DIRECTED
Qty: 1 KIT | Refills: 0 | Status: SHIPPED | OUTPATIENT
Start: 2024-03-27

## 2024-07-14 DIAGNOSIS — E10.9 TYPE 1 DIABETES MELLITUS WITHOUT COMPLICATION: ICD-10-CM

## 2024-07-15 RX ORDER — INSULIN GLARGINE-YFGN 100 [IU]/ML
25 INJECTION, SOLUTION SUBCUTANEOUS DAILY
Qty: 24 ML | Refills: 1 | Status: SHIPPED | OUTPATIENT
Start: 2024-07-15

## 2024-07-15 NOTE — TELEPHONE ENCOUNTER
Rx Refill Note  Requested Prescriptions     Pending Prescriptions Disp Refills    Insulin Glargine-yfgn (Semglee, yftravis,) 100 UNIT/ML solution pen-injector 24 mL 1     Sig: Inject 25 Units under the skin into the appropriate area as directed Daily.      Last office visit with prescribing clinician: 3/20/2024   Last telemedicine visit with prescribing clinician: Visit date not found   Next office visit with prescribing clinician: 7/23/2024                         Would you like a call back once the refill request has been completed: [] Yes [] No    If the office needs to give you a call back, can they leave a voicemail: [] Yes [] No    Katelin Acevedo MA  07/15/24, 07:40 EDT

## 2024-08-05 ENCOUNTER — TELEPHONE (OUTPATIENT)
Dept: ENDOCRINOLOGY | Age: 34
End: 2024-08-05
Payer: COMMERCIAL

## 2024-09-04 DIAGNOSIS — E10.9 TYPE 1 DIABETES MELLITUS WITHOUT COMPLICATION: ICD-10-CM

## 2024-09-04 RX ORDER — INSULIN GLARGINE-YFGN 100 [IU]/ML
25 INJECTION, SOLUTION SUBCUTANEOUS DAILY
Qty: 15 ML | Refills: 1 | Status: SHIPPED | OUTPATIENT
Start: 2024-09-04 | End: 2024-09-06

## 2024-09-05 ENCOUNTER — PRIOR AUTHORIZATION (OUTPATIENT)
Dept: ENDOCRINOLOGY | Age: 34
End: 2024-09-05
Payer: COMMERCIAL

## 2024-09-05 NOTE — TELEPHONE ENCOUNTER
A PA for Semglee (yfgn) 100UNIT/ML pen-injectors has been started.    (Key: [id number])  PA Case ID #: [id number]

## 2024-09-06 ENCOUNTER — SPECIALTY PHARMACY (OUTPATIENT)
Dept: ENDOCRINOLOGY | Age: 34
End: 2024-09-06
Payer: COMMERCIAL

## 2024-09-06 DIAGNOSIS — E10.9 TYPE 1 DIABETES MELLITUS WITHOUT COMPLICATION: Primary | ICD-10-CM

## 2024-09-06 RX ORDER — INSULIN DEGLUDEC 100 U/ML
25 INJECTION, SOLUTION SUBCUTANEOUS DAILY
Qty: 15 ML | Refills: 3 | Status: SHIPPED | OUTPATIENT
Start: 2024-09-06

## 2024-09-06 NOTE — PROGRESS NOTES
Benefits Investigation Summary    Prescription: New Therapy    Dispensing pharmacy: [facility]    Copay amount: Tresiba $37.50/60 days (15  mL)    Prior Auth and Med Assistance notes: N/A    BIN: [id number]  PCN: [id number]  RX GROUP: MISSAEL Martinez, LauraD, MM   Clinical Speciality Pharmacist, Endocrinology   9/6/2024  07:59 EDT

## 2024-09-06 NOTE — TELEPHONE ENCOUNTER
Denied on September 5 by [facility]    Request Reference Number: [id number]. SEMGLEE INJ 100U/ML is denied due to Plan Exclusion. For further questions, call [phone].

## 2024-10-28 ENCOUNTER — OFFICE VISIT (OUTPATIENT)
Dept: ENDOCRINOLOGY | Age: 34
End: 2024-10-28
Payer: COMMERCIAL

## 2024-10-28 VITALS
OXYGEN SATURATION: 98 % | BODY MASS INDEX: 27.78 KG/M2 | SYSTOLIC BLOOD PRESSURE: 120 MMHG | DIASTOLIC BLOOD PRESSURE: 80 MMHG | HEART RATE: 104 BPM | TEMPERATURE: 97.9 F | WEIGHT: 177.4 LBS

## 2024-10-28 DIAGNOSIS — E10.9 TYPE 1 DIABETES MELLITUS WITHOUT COMPLICATION: Primary | ICD-10-CM

## 2024-10-28 PROCEDURE — 99214 OFFICE O/P EST MOD 30 MIN: CPT | Performed by: INTERNAL MEDICINE

## 2024-10-28 NOTE — PROGRESS NOTES
Chief complaint/Reason for consult: T1DM    HPI:   - 34 year old female here for management of diabetes mellitus type 1  - Was last seen in 3/2024  - No changes since last visit  - Has had diabetes since 2021  - No known complications to date  - Is currently taking Tresiba 25 units qhs and Novolog 1 unit for every 15 grams of carbohydrate and 1 unit for every 30 in BG above 150  - Denies hypoglycemia  - She uses a DexCom G7 CGM which was not able to be downloaded today    The following portions of the patient's history were reviewed and updated as appropriate: allergies, current medications, past family history, past medical history, past social history, past surgical history, and problem list.      Objective     Vitals:    10/28/24 1451   BP: 120/80   Pulse: 104   Temp: 97.9 °F (36.6 °C)   SpO2: 98%        Physical Exam  Vitals reviewed.   Constitutional:       Appearance: Normal appearance.   HENT:      Head: Normocephalic and atraumatic.   Eyes:      General: No scleral icterus.  Pulmonary:      Effort: Pulmonary effort is normal. No respiratory distress.   Neurological:      Mental Status: She is alert.      Gait: Gait normal.   Psychiatric:         Mood and Affect: Mood normal.         Behavior: Behavior normal.         Thought Content: Thought content normal.         Judgment: Judgment normal.         Assessment & Plan   Type 1 DM, uncontrolled due to hyperglycemia at risk for complication  - Cont. Lantus to 25 units qhs  - Cont. Novolog 1 unit for every 15 grams of carbohydrate and 1 unit for every 30 in BG above 150  - We did discuss insulin pumps but she does not want to do that now     - Return to clinic in 3 months

## 2024-11-05 ENCOUNTER — PATIENT MESSAGE (OUTPATIENT)
Dept: ENDOCRINOLOGY | Age: 34
End: 2024-11-05
Payer: COMMERCIAL

## 2024-11-05 DIAGNOSIS — E10.9 TYPE 1 DIABETES MELLITUS WITHOUT COMPLICATION: ICD-10-CM

## 2024-11-05 RX ORDER — ACYCLOVIR 400 MG/1
1 TABLET ORAL
Qty: 9 EACH | Refills: 3 | Status: SHIPPED | OUTPATIENT
Start: 2024-11-05

## 2024-12-10 ENCOUNTER — PATIENT MESSAGE (OUTPATIENT)
Dept: ENDOCRINOLOGY | Age: 34
End: 2024-12-10
Payer: COMMERCIAL

## 2024-12-10 DIAGNOSIS — E10.9 TYPE 1 DIABETES MELLITUS WITHOUT COMPLICATION: ICD-10-CM

## 2024-12-10 RX ORDER — INSULIN DEGLUDEC 100 U/ML
25 INJECTION, SOLUTION SUBCUTANEOUS DAILY
Qty: 15 ML | Refills: 3 | Status: SHIPPED | OUTPATIENT
Start: 2024-12-10

## 2025-04-18 DIAGNOSIS — E10.9 TYPE 1 DIABETES MELLITUS WITHOUT COMPLICATION: Primary | ICD-10-CM

## 2025-04-18 RX ORDER — INSULIN GLARGINE 100 [IU]/ML
25 INJECTION, SOLUTION SUBCUTANEOUS DAILY
Qty: 9 ML | Refills: 1 | Status: SHIPPED | OUTPATIENT
Start: 2025-04-18